# Patient Record
Sex: MALE | Race: WHITE | NOT HISPANIC OR LATINO
[De-identification: names, ages, dates, MRNs, and addresses within clinical notes are randomized per-mention and may not be internally consistent; named-entity substitution may affect disease eponyms.]

---

## 2019-10-21 PROBLEM — Z00.00 ENCOUNTER FOR PREVENTIVE HEALTH EXAMINATION: Status: ACTIVE | Noted: 2019-10-21

## 2019-10-25 ENCOUNTER — APPOINTMENT (OUTPATIENT)
Dept: CARDIOLOGY | Facility: CLINIC | Age: 39
End: 2019-10-25
Payer: COMMERCIAL

## 2019-10-25 VITALS
WEIGHT: 190 LBS | DIASTOLIC BLOOD PRESSURE: 70 MMHG | BODY MASS INDEX: 28.14 KG/M2 | HEIGHT: 69 IN | SYSTOLIC BLOOD PRESSURE: 110 MMHG

## 2019-10-25 DIAGNOSIS — Z84.1 FAMILY HISTORY OF DISORDERS OF KIDNEY AND URETER: ICD-10-CM

## 2019-10-25 DIAGNOSIS — R00.2 PALPITATIONS: ICD-10-CM

## 2019-10-25 DIAGNOSIS — Z83.49 FAMILY HISTORY OF OTHER ENDOCRINE, NUTRITIONAL AND METABOLIC DISEASES: ICD-10-CM

## 2019-10-25 DIAGNOSIS — Z87.891 PERSONAL HISTORY OF NICOTINE DEPENDENCE: ICD-10-CM

## 2019-10-25 DIAGNOSIS — Z83.3 FAMILY HISTORY OF DIABETES MELLITUS: ICD-10-CM

## 2019-10-25 DIAGNOSIS — Z82.49 FAMILY HISTORY OF ISCHEMIC HEART DISEASE AND OTHER DISEASES OF THE CIRCULATORY SYSTEM: ICD-10-CM

## 2019-10-25 PROCEDURE — 99204 OFFICE O/P NEW MOD 45 MIN: CPT

## 2019-10-25 PROCEDURE — 93000 ELECTROCARDIOGRAM COMPLETE: CPT

## 2019-10-25 RX ORDER — METFORMIN HYDROCHLORIDE 500 MG/1
500 TABLET, COATED ORAL
Refills: 0 | Status: ACTIVE | COMMUNITY

## 2019-10-25 RX ORDER — EMPAGLIFLOZIN 25 MG/1
25 TABLET, FILM COATED ORAL
Refills: 0 | Status: ACTIVE | COMMUNITY

## 2019-10-25 RX ORDER — LISINOPRIL 5 MG/1
5 TABLET ORAL
Refills: 0 | Status: ACTIVE | COMMUNITY

## 2019-10-25 NOTE — ASSESSMENT
[FreeTextEntry1] : Diabetes - c/w current therapy. Follow FS. repeat HgA1c with Dr. Marcial in 3 months.\par \par Dyslipidemia - elevated TG. Diet, weight loss disucssed. Repeat labs in 3 months - expect improvement, once DM is controlled.\par \par BP - controlled with lisinopril.\par \par Palpitations, abnormal ECG  - obtain echo to assess for structural heart disease, obtain stress test.\par \par If normal - primary prevention.\par \par Discussed with patient and wife.

## 2019-10-25 NOTE — PHYSICAL EXAM
[Well Groomed] : well groomed [General Appearance - Well Developed] : well developed [Normal Appearance] : normal appearance [No Deformities] : no deformities [General Appearance - In No Acute Distress] : no acute distress [General Appearance - Well Nourished] : well nourished [Normal Conjunctiva] : the conjunctiva exhibited no abnormalities [Eyelids - No Xanthelasma] : the eyelids demonstrated no xanthelasmas [Normal Oral Mucosa] : normal oral mucosa [FreeTextEntry1] : no JVD [No Oral Pallor] : no oral pallor [Heart Sounds] : normal S1 and S2 [Heart Rate And Rhythm] : heart rate and rhythm were normal [Murmurs] : no murmurs present [Edema] : no peripheral edema present [] : no respiratory distress [Auscultation Breath Sounds / Voice Sounds] : lungs were clear to auscultation bilaterally [Bowel Sounds] : normal bowel sounds [Respiration, Rhythm And Depth] : normal respiratory rhythm and effort [Abdomen Soft] : soft [Abdomen Tenderness] : non-tender [Abnormal Walk] : normal gait [Nail Clubbing] : no clubbing of the fingernails [Cyanosis, Localized] : no localized cyanosis [No Venous Stasis] : no venous stasis [Skin Color & Pigmentation] : normal skin color and pigmentation [Oriented To Time, Place, And Person] : oriented to person, place, and time [Affect] : the affect was normal

## 2019-11-15 ENCOUNTER — APPOINTMENT (OUTPATIENT)
Dept: CARDIOLOGY | Facility: CLINIC | Age: 39
End: 2019-11-15
Payer: COMMERCIAL

## 2019-11-15 PROCEDURE — 99213 OFFICE O/P EST LOW 20 MIN: CPT | Mod: 25

## 2019-11-15 PROCEDURE — 93306 TTE W/DOPPLER COMPLETE: CPT

## 2019-11-15 PROCEDURE — 93015 CV STRESS TEST SUPVJ I&R: CPT

## 2019-11-15 NOTE — PHYSICAL EXAM
[General Appearance - Well Developed] : well developed [Normal Appearance] : normal appearance [Well Groomed] : well groomed [No Deformities] : no deformities [General Appearance - Well Nourished] : well nourished [General Appearance - In No Acute Distress] : no acute distress [Normal Conjunctiva] : the conjunctiva exhibited no abnormalities [Eyelids - No Xanthelasma] : the eyelids demonstrated no xanthelasmas [Normal Oral Mucosa] : normal oral mucosa [No Oral Pallor] : no oral pallor [FreeTextEntry1] : no JVD [Heart Sounds] : normal S1 and S2 [Heart Rate And Rhythm] : heart rate and rhythm were normal [Murmurs] : no murmurs present [Edema] : no peripheral edema present [] : no respiratory distress [Respiration, Rhythm And Depth] : normal respiratory rhythm and effort [Bowel Sounds] : normal bowel sounds [Auscultation Breath Sounds / Voice Sounds] : lungs were clear to auscultation bilaterally [Abdomen Soft] : soft [Abdomen Tenderness] : non-tender [Nail Clubbing] : no clubbing of the fingernails [Abnormal Walk] : normal gait [Cyanosis, Localized] : no localized cyanosis [Skin Color & Pigmentation] : normal skin color and pigmentation [No Venous Stasis] : no venous stasis [Affect] : the affect was normal [Oriented To Time, Place, And Person] : oriented to person, place, and time

## 2019-11-15 NOTE — ASSESSMENT
[FreeTextEntry1] : Stress test and echo results discussed with patient.\par Normal LV function, no structural disease, normal exercise response.\par Fidnigds discussed with the patient.\par C/w DM, Lipid and BP control.\par Has been tobacco free since the last visit.\par \par C/w primary prevention.\par \par Discussed with patient \par F/u PRN

## 2022-09-23 ENCOUNTER — INPATIENT (INPATIENT)
Facility: HOSPITAL | Age: 42
LOS: 3 days | Discharge: HOME | End: 2022-09-27
Attending: INTERNAL MEDICINE | Admitting: INTERNAL MEDICINE

## 2022-09-23 VITALS
HEIGHT: 69 IN | OXYGEN SATURATION: 99 % | SYSTOLIC BLOOD PRESSURE: 126 MMHG | HEART RATE: 112 BPM | RESPIRATION RATE: 16 BRPM | WEIGHT: 149.91 LBS | DIASTOLIC BLOOD PRESSURE: 79 MMHG | TEMPERATURE: 98 F

## 2022-09-23 LAB
ALBUMIN SERPL ELPH-MCNC: 4.7 G/DL — SIGNIFICANT CHANGE UP (ref 3.5–5.2)
ALP SERPL-CCNC: 115 U/L — SIGNIFICANT CHANGE UP (ref 30–115)
ALT FLD-CCNC: 14 U/L — SIGNIFICANT CHANGE UP (ref 0–41)
ANION GAP SERPL CALC-SCNC: 14 MMOL/L — SIGNIFICANT CHANGE UP (ref 7–14)
ANION GAP SERPL CALC-SCNC: 18 MMOL/L — HIGH (ref 7–14)
ANION GAP SERPL CALC-SCNC: 37 MMOL/L — HIGH (ref 7–14)
APPEARANCE UR: CLEAR — SIGNIFICANT CHANGE UP
AST SERPL-CCNC: 12 U/L — SIGNIFICANT CHANGE UP (ref 0–41)
B-OH-BUTYR SERPL-SCNC: >9 MMOL/L — HIGH
BACTERIA # UR AUTO: NEGATIVE — SIGNIFICANT CHANGE UP
BASE EXCESS BLDV CALC-SCNC: -24.5 MMOL/L — LOW (ref -2–3)
BASOPHILS # BLD AUTO: 0.08 K/UL — SIGNIFICANT CHANGE UP (ref 0–0.2)
BASOPHILS NFR BLD AUTO: 0.4 % — SIGNIFICANT CHANGE UP (ref 0–1)
BILIRUB DIRECT SERPL-MCNC: <0.2 MG/DL — SIGNIFICANT CHANGE UP (ref 0–0.3)
BILIRUB INDIRECT FLD-MCNC: SIGNIFICANT CHANGE UP MG/DL (ref 0.2–1.2)
BILIRUB SERPL-MCNC: <0.2 MG/DL — SIGNIFICANT CHANGE UP (ref 0.2–1.2)
BILIRUB UR-MCNC: NEGATIVE — SIGNIFICANT CHANGE UP
BUN SERPL-MCNC: 15 MG/DL — SIGNIFICANT CHANGE UP (ref 10–20)
BUN SERPL-MCNC: 18 MG/DL — SIGNIFICANT CHANGE UP (ref 10–20)
BUN SERPL-MCNC: 23 MG/DL — HIGH (ref 10–20)
CALCIUM SERPL-MCNC: 10 MG/DL — SIGNIFICANT CHANGE UP (ref 8.4–10.5)
CALCIUM SERPL-MCNC: 12 MG/DL — HIGH (ref 8.4–10.5)
CALCIUM SERPL-MCNC: 9.8 MG/DL — SIGNIFICANT CHANGE UP (ref 8.4–10.5)
CHLORIDE SERPL-SCNC: 107 MMOL/L — SIGNIFICANT CHANGE UP (ref 98–110)
CHLORIDE SERPL-SCNC: 108 MMOL/L — SIGNIFICANT CHANGE UP (ref 98–110)
CHLORIDE SERPL-SCNC: 94 MMOL/L — LOW (ref 98–110)
CO2 SERPL-SCNC: 10 MMOL/L — LOW (ref 17–32)
CO2 SERPL-SCNC: 13 MMOL/L — LOW (ref 17–32)
CO2 SERPL-SCNC: 4 MMOL/L — CRITICAL LOW (ref 17–32)
COLOR SPEC: SIGNIFICANT CHANGE UP
CREAT SERPL-MCNC: 1 MG/DL — SIGNIFICANT CHANGE UP (ref 0.7–1.5)
CREAT SERPL-MCNC: 1 MG/DL — SIGNIFICANT CHANGE UP (ref 0.7–1.5)
CREAT SERPL-MCNC: 1.6 MG/DL — HIGH (ref 0.7–1.5)
CRP SERPL-MCNC: 153 MG/L — HIGH
D DIMER BLD IA.RAPID-MCNC: 608 NG/ML DDU — HIGH (ref 0–230)
DIFF PNL FLD: ABNORMAL
EGFR: 55 ML/MIN/1.73M2 — LOW
EGFR: 96 ML/MIN/1.73M2 — SIGNIFICANT CHANGE UP
EGFR: 96 ML/MIN/1.73M2 — SIGNIFICANT CHANGE UP
EOSINOPHIL # BLD AUTO: 0 K/UL — SIGNIFICANT CHANGE UP (ref 0–0.7)
EOSINOPHIL NFR BLD AUTO: 0 % — SIGNIFICANT CHANGE UP (ref 0–8)
EPI CELLS # UR: 2 /HPF — SIGNIFICANT CHANGE UP (ref 0–5)
ERYTHROCYTE [SEDIMENTATION RATE] IN BLOOD: 40 MM/HR — HIGH (ref 0–10)
GAS PNL BLDA: SIGNIFICANT CHANGE UP
GAS PNL BLDV: SIGNIFICANT CHANGE UP
GLUCOSE BLDC GLUCOMTR-MCNC: 121 MG/DL — HIGH (ref 70–99)
GLUCOSE BLDC GLUCOMTR-MCNC: 129 MG/DL — HIGH (ref 70–99)
GLUCOSE BLDC GLUCOMTR-MCNC: 132 MG/DL — HIGH (ref 70–99)
GLUCOSE BLDC GLUCOMTR-MCNC: 132 MG/DL — HIGH (ref 70–99)
GLUCOSE BLDC GLUCOMTR-MCNC: 136 MG/DL — HIGH (ref 70–99)
GLUCOSE BLDC GLUCOMTR-MCNC: 136 MG/DL — HIGH (ref 70–99)
GLUCOSE BLDC GLUCOMTR-MCNC: 140 MG/DL — HIGH (ref 70–99)
GLUCOSE BLDC GLUCOMTR-MCNC: 157 MG/DL — HIGH (ref 70–99)
GLUCOSE BLDC GLUCOMTR-MCNC: 161 MG/DL — HIGH (ref 70–99)
GLUCOSE SERPL-MCNC: 135 MG/DL — HIGH (ref 70–99)
GLUCOSE SERPL-MCNC: 140 MG/DL — HIGH (ref 70–99)
GLUCOSE SERPL-MCNC: 368 MG/DL — HIGH (ref 70–99)
GLUCOSE UR QL: ABNORMAL
HCO3 BLDV-SCNC: 6 MMOL/L — CRITICAL LOW (ref 22–29)
HCT VFR BLD CALC: 51.3 % — SIGNIFICANT CHANGE UP (ref 42–52)
HGB BLD-MCNC: 16.4 G/DL — SIGNIFICANT CHANGE UP (ref 14–18)
HYALINE CASTS # UR AUTO: 4 /LPF — SIGNIFICANT CHANGE UP (ref 0–7)
IMM GRANULOCYTES NFR BLD AUTO: 1.6 % — HIGH (ref 0.1–0.3)
KETONES UR-MCNC: ABNORMAL
LACTATE BLDV-MCNC: 2.4 MMOL/L — HIGH (ref 0.5–2)
LEUKOCYTE ESTERASE UR-ACNC: NEGATIVE — SIGNIFICANT CHANGE UP
LIDOCAIN IGE QN: 102 U/L — HIGH (ref 7–60)
LYMPHOCYTES # BLD AUTO: 1.07 K/UL — LOW (ref 1.2–3.4)
LYMPHOCYTES # BLD AUTO: 5.7 % — LOW (ref 20.5–51.1)
MAGNESIUM SERPL-MCNC: 2.4 MG/DL — SIGNIFICANT CHANGE UP (ref 1.8–2.4)
MCHC RBC-ENTMCNC: 28.7 PG — SIGNIFICANT CHANGE UP (ref 27–31)
MCHC RBC-ENTMCNC: 32 G/DL — SIGNIFICANT CHANGE UP (ref 32–37)
MCV RBC AUTO: 89.7 FL — SIGNIFICANT CHANGE UP (ref 80–94)
MONOCYTES # BLD AUTO: 0.91 K/UL — HIGH (ref 0.1–0.6)
MONOCYTES NFR BLD AUTO: 4.8 % — SIGNIFICANT CHANGE UP (ref 1.7–9.3)
NEUTROPHILS # BLD AUTO: 16.47 K/UL — HIGH (ref 1.4–6.5)
NEUTROPHILS NFR BLD AUTO: 87.5 % — HIGH (ref 42.2–75.2)
NITRITE UR-MCNC: NEGATIVE — SIGNIFICANT CHANGE UP
NRBC # BLD: 0 /100 WBCS — SIGNIFICANT CHANGE UP (ref 0–0)
OSMOLALITY SERPL: 325 MOS/KG — HIGH (ref 275–300)
PCO2 BLDV: 27 MMHG — LOW (ref 42–55)
PH BLDV: 6.97 — LOW (ref 7.32–7.43)
PH UR: 5.5 — SIGNIFICANT CHANGE UP (ref 5–8)
PHOSPHATE SERPL-MCNC: 6.6 MG/DL — HIGH (ref 2.1–4.9)
PLATELET # BLD AUTO: 363 K/UL — SIGNIFICANT CHANGE UP (ref 130–400)
PO2 BLDV: 34 MMHG — SIGNIFICANT CHANGE UP
POTASSIUM SERPL-MCNC: 4.7 MMOL/L — SIGNIFICANT CHANGE UP (ref 3.5–5)
POTASSIUM SERPL-MCNC: 6.1 MMOL/L — CRITICAL HIGH (ref 3.5–5)
POTASSIUM SERPL-MCNC: 6.5 MMOL/L — CRITICAL HIGH (ref 3.5–5)
POTASSIUM SERPL-SCNC: 4.7 MMOL/L — SIGNIFICANT CHANGE UP (ref 3.5–5)
POTASSIUM SERPL-SCNC: 6.1 MMOL/L — CRITICAL HIGH (ref 3.5–5)
POTASSIUM SERPL-SCNC: 6.5 MMOL/L — CRITICAL HIGH (ref 3.5–5)
PROT SERPL-MCNC: 7.9 G/DL — SIGNIFICANT CHANGE UP (ref 6–8)
PROT UR-MCNC: ABNORMAL
RBC # BLD: 5.72 M/UL — SIGNIFICANT CHANGE UP (ref 4.7–6.1)
RBC # FLD: 12.3 % — SIGNIFICANT CHANGE UP (ref 11.5–14.5)
RBC CASTS # UR COMP ASSIST: 6 /HPF — HIGH (ref 0–4)
SAO2 % BLDV: 52.2 % — SIGNIFICANT CHANGE UP
SARS-COV-2 RNA SPEC QL NAA+PROBE: DETECTED
SODIUM SERPL-SCNC: 134 MMOL/L — LOW (ref 135–146)
SODIUM SERPL-SCNC: 135 MMOL/L — SIGNIFICANT CHANGE UP (ref 135–146)
SODIUM SERPL-SCNC: 136 MMOL/L — SIGNIFICANT CHANGE UP (ref 135–146)
SP GR SPEC: 1.03 — SIGNIFICANT CHANGE UP (ref 1.01–1.03)
TROPONIN T SERPL-MCNC: <0.01 NG/ML — SIGNIFICANT CHANGE UP
UROBILINOGEN FLD QL: SIGNIFICANT CHANGE UP
WBC # BLD: 18.83 K/UL — HIGH (ref 4.8–10.8)
WBC # FLD AUTO: 18.83 K/UL — HIGH (ref 4.8–10.8)
WBC UR QL: 1 /HPF — SIGNIFICANT CHANGE UP (ref 0–5)

## 2022-09-23 PROCEDURE — 99221 1ST HOSP IP/OBS SF/LOW 40: CPT

## 2022-09-23 PROCEDURE — 93010 ELECTROCARDIOGRAM REPORT: CPT

## 2022-09-23 PROCEDURE — 71045 X-RAY EXAM CHEST 1 VIEW: CPT | Mod: 26

## 2022-09-23 PROCEDURE — G1004: CPT

## 2022-09-23 PROCEDURE — 99291 CRITICAL CARE FIRST HOUR: CPT | Mod: GC

## 2022-09-23 PROCEDURE — 74177 CT ABD & PELVIS W/CONTRAST: CPT | Mod: 26,MF

## 2022-09-23 PROCEDURE — 99291 CRITICAL CARE FIRST HOUR: CPT

## 2022-09-23 RX ORDER — INSULIN HUMAN 100 [IU]/ML
4 INJECTION, SOLUTION SUBCUTANEOUS
Qty: 100 | Refills: 0 | Status: DISCONTINUED | OUTPATIENT
Start: 2022-09-23 | End: 2022-09-24

## 2022-09-23 RX ORDER — SODIUM CHLORIDE 9 MG/ML
1000 INJECTION, SOLUTION INTRAVENOUS
Refills: 0 | Status: DISCONTINUED | OUTPATIENT
Start: 2022-09-23 | End: 2022-09-23

## 2022-09-23 RX ORDER — SITAGLIPTIN AND METFORMIN HYDROCHLORIDE 500; 50 MG/1; MG/1
0 TABLET, FILM COATED ORAL
Qty: 0 | Refills: 0 | DISCHARGE

## 2022-09-23 RX ORDER — SODIUM CHLORIDE 9 MG/ML
2000 INJECTION, SOLUTION INTRAVENOUS ONCE
Refills: 0 | Status: COMPLETED | OUTPATIENT
Start: 2022-09-23 | End: 2022-09-23

## 2022-09-23 RX ORDER — SODIUM CHLORIDE 9 MG/ML
1000 INJECTION INTRAMUSCULAR; INTRAVENOUS; SUBCUTANEOUS ONCE
Refills: 0 | Status: COMPLETED | OUTPATIENT
Start: 2022-09-23 | End: 2022-09-23

## 2022-09-23 RX ORDER — FAMOTIDINE 10 MG/ML
20 INJECTION INTRAVENOUS ONCE
Refills: 0 | Status: COMPLETED | OUTPATIENT
Start: 2022-09-23 | End: 2022-09-23

## 2022-09-23 RX ORDER — ONDANSETRON 8 MG/1
4 TABLET, FILM COATED ORAL ONCE
Refills: 0 | Status: COMPLETED | OUTPATIENT
Start: 2022-09-23 | End: 2022-09-23

## 2022-09-23 RX ORDER — CHLORHEXIDINE GLUCONATE 213 G/1000ML
1 SOLUTION TOPICAL
Refills: 0 | Status: DISCONTINUED | OUTPATIENT
Start: 2022-09-23 | End: 2022-09-27

## 2022-09-23 RX ORDER — INSULIN HUMAN 100 [IU]/ML
6.8 INJECTION, SOLUTION SUBCUTANEOUS
Qty: 100 | Refills: 0 | Status: DISCONTINUED | OUTPATIENT
Start: 2022-09-23 | End: 2022-09-23

## 2022-09-23 RX ORDER — INSULIN HUMAN 100 [IU]/ML
10 INJECTION, SOLUTION SUBCUTANEOUS ONCE
Refills: 0 | Status: COMPLETED | OUTPATIENT
Start: 2022-09-23 | End: 2022-09-23

## 2022-09-23 RX ORDER — SODIUM CHLORIDE 9 MG/ML
1000 INJECTION, SOLUTION INTRAVENOUS
Refills: 0 | Status: DISCONTINUED | OUTPATIENT
Start: 2022-09-23 | End: 2022-09-24

## 2022-09-23 RX ORDER — ACETAMINOPHEN 500 MG
650 TABLET ORAL EVERY 6 HOURS
Refills: 0 | Status: DISCONTINUED | OUTPATIENT
Start: 2022-09-23 | End: 2022-09-27

## 2022-09-23 RX ORDER — HEPARIN SODIUM 5000 [USP'U]/ML
5000 INJECTION INTRAVENOUS; SUBCUTANEOUS EVERY 12 HOURS
Refills: 0 | Status: DISCONTINUED | OUTPATIENT
Start: 2022-09-23 | End: 2022-09-27

## 2022-09-23 RX ORDER — ONDANSETRON 8 MG/1
4 TABLET, FILM COATED ORAL EVERY 8 HOURS
Refills: 0 | Status: DISCONTINUED | OUTPATIENT
Start: 2022-09-23 | End: 2022-09-27

## 2022-09-23 RX ORDER — ATORVASTATIN CALCIUM 80 MG/1
10 TABLET, FILM COATED ORAL DAILY
Refills: 0 | Status: DISCONTINUED | OUTPATIENT
Start: 2022-09-23 | End: 2022-09-27

## 2022-09-23 RX ORDER — INSULIN HUMAN 100 [IU]/ML
6 INJECTION, SOLUTION SUBCUTANEOUS ONCE
Refills: 0 | Status: DISCONTINUED | OUTPATIENT
Start: 2022-09-23 | End: 2022-09-23

## 2022-09-23 RX ORDER — PANTOPRAZOLE SODIUM 20 MG/1
40 TABLET, DELAYED RELEASE ORAL DAILY
Refills: 0 | Status: DISCONTINUED | OUTPATIENT
Start: 2022-09-23 | End: 2022-09-25

## 2022-09-23 RX ORDER — LANOLIN ALCOHOL/MO/W.PET/CERES
3 CREAM (GRAM) TOPICAL AT BEDTIME
Refills: 0 | Status: DISCONTINUED | OUTPATIENT
Start: 2022-09-23 | End: 2022-09-27

## 2022-09-23 RX ADMIN — ONDANSETRON 4 MILLIGRAM(S): 8 TABLET, FILM COATED ORAL at 09:44

## 2022-09-23 RX ADMIN — INSULIN HUMAN 4 UNIT(S)/HR: 100 INJECTION, SOLUTION SUBCUTANEOUS at 13:30

## 2022-09-23 RX ADMIN — SODIUM CHLORIDE 1000 MILLILITER(S): 9 INJECTION INTRAMUSCULAR; INTRAVENOUS; SUBCUTANEOUS at 10:25

## 2022-09-23 RX ADMIN — SODIUM CHLORIDE 200 MILLILITER(S): 9 INJECTION, SOLUTION INTRAVENOUS at 16:45

## 2022-09-23 RX ADMIN — SODIUM CHLORIDE 1000 MILLILITER(S): 9 INJECTION INTRAMUSCULAR; INTRAVENOUS; SUBCUTANEOUS at 10:15

## 2022-09-23 RX ADMIN — INSULIN HUMAN 6.8 UNIT(S)/HR: 100 INJECTION, SOLUTION SUBCUTANEOUS at 11:43

## 2022-09-23 RX ADMIN — SODIUM CHLORIDE 200 MILLILITER(S): 9 INJECTION, SOLUTION INTRAVENOUS at 14:42

## 2022-09-23 RX ADMIN — SODIUM CHLORIDE 2000 MILLILITER(S): 9 INJECTION, SOLUTION INTRAVENOUS at 11:04

## 2022-09-23 RX ADMIN — SODIUM CHLORIDE 2000 MILLILITER(S): 9 INJECTION, SOLUTION INTRAVENOUS at 13:44

## 2022-09-23 RX ADMIN — INSULIN HUMAN 10 UNIT(S): 100 INJECTION, SOLUTION SUBCUTANEOUS at 11:42

## 2022-09-23 RX ADMIN — FAMOTIDINE 20 MILLIGRAM(S): 10 INJECTION INTRAVENOUS at 09:47

## 2022-09-23 RX ADMIN — SODIUM CHLORIDE 1000 MILLILITER(S): 9 INJECTION INTRAMUSCULAR; INTRAVENOUS; SUBCUTANEOUS at 11:07

## 2022-09-23 RX ADMIN — SODIUM CHLORIDE 1000 MILLILITER(S): 9 INJECTION INTRAMUSCULAR; INTRAVENOUS; SUBCUTANEOUS at 09:47

## 2022-09-23 NOTE — CONSULT NOTE ADULT - SUBJECTIVE AND OBJECTIVE BOX
HPI:  41 yo M with PMHX of latent autoimmune diabetes ( DM 1.5) presents to the ED for flu like symptoms after testing for COVID 5 days ago. Patient states that his symptoms started on monday where he was having fevers, shortness of breath and decreased po intake. Because he was not eating patient did not take his diabetes medications. He endorses one episode of vomiting which he had after taking paxlovid yesterday for the first time yesterday. Patient has not had any BM for 4 days now. he does endorse passing gas.     In the ED     T(F): 97.6 (23 Sep 2022 12:49), Max: 97.8 (23 Sep 2022 08:23)  HR: 109 (23 Sep 2022 12:49) (109 - 112)  BP: 120/74 (23 Sep 2022 12:49) (120/74 - 126/79)  BP(mean): 93 (23 Sep 2022 12:49) (93 - 94)  RR: 18 (23 Sep 2022 12:49) (16 - 18)  SpO2: 100%       LABS:                        16.4   18.83 )-----------( 363      ( 23 Sep 2022 09:40 )             51.3       135  |  94<L>  |  23<H>  ----------------------------<  368<H>  6.5<HH>   |  4<LL>  |  1.6<H>    Ca    12.0<H>      23 Sep 2022 09:40  Phos  6.6       Mg     2.4         TPro  7.9  /  Alb  4.7  /  TBili  <0.2  /  DBili  <0.2  /  AST  12  /  ALT  14  /  AlkPhos  115  09-23    Beta Hydroxy-Butyrate: >9.0 mmoL/L (22 @ 09:40)     Color: Light Yellow / Appearance: Clear / S.029 / pH: x  Gluc: x / Ketone: Large  / Bili: Negative / Urobili: <2 mg/dL   Blood: x / Protein: 30 mg/dL / Nitrite: Negative   Leuk Esterase: Negative / RBC: 6 /HPF / WBC 1 /HPF   Sq Epi: x / Non Sq Epi: 2 /HPF / Bacteria: Negative    CT abdomen - Severely distended fluid-filled stomach with transition at the duodenum.  This could represent gastric outlet obstruction versus gastroparesis. Bilateral lower lung zone opacities as detailed above. Correlate for infectious/inflammatory etiologies. (23 Sep 2022 14:23)      PAST MEDICAL & SURGICAL HISTORY  Diabetes      SOCIAL HISTORY:  []smoker  []Alcohol  []Drug    ALLERGIES:  No Known Allergies      MEDICATIONS:  MEDICATIONS  (STANDING):  atorvastatin Oral Tab/Cap - Peds 10 milliGRAM(s) Oral daily  chlorhexidine 2% Cloths 1 Application(s) Topical <User Schedule>  dextrose 5% + sodium chloride 0.45% 1000 milliLiter(s) (200 mL/Hr) IV Continuous <Continuous>  heparin   Injectable 5000 Unit(s) SubCutaneous every 12 hours  insulin regular Infusion 4 Unit(s)/Hr (4 mL/Hr) IV Continuous <Continuous>  pantoprazole  Injectable 40 milliGRAM(s) IV Push daily    MEDICATIONS  (PRN):  acetaminophen     Tablet .. 650 milliGRAM(s) Oral every 6 hours PRN Temp greater or equal to 38C (100.4F), Mild Pain (1 - 3)  aluminum hydroxide/magnesium hydroxide/simethicone Suspension 30 milliLiter(s) Oral every 4 hours PRN Dyspepsia  melatonin 3 milliGRAM(s) Oral at bedtime PRN Insomnia  ondansetron Injectable 4 milliGRAM(s) IV Push every 8 hours PRN Nausea and/or Vomiting      HOME MEDICATIONS:  Home Medications:  ATORVASTATIN 10 MG TABLET:  (23 Sep 2022 14:34)  Janumet  mg-1000 mg oral tablet, extended release: 1 tab(s) orally once a day (in the evening) (23 Sep 2022 14:34)  JARDIANCE 10 MG TABLET: TAKE 1 TABLET BY MOUTH EVERY DAY (23 Sep 2022 14:34)  LISINOPRIL 5 MG TABLET:  (23 Sep 2022 14:34)  TRESIBA FLEXTOUCH 100 UNIT/ML: INJECT 8 UNITS UNDER THE SKIN NIGHTLY. INDICATION DIABETES (23 Sep 2022 14:34)      VITALS:   T(F): 97.7 ( @ 15:49), Max: 98.3 ( @ 15:33)  HR: 102 ( @ 15:49) (98 - 112)  BP: 98/63 ( @ 15:49) (98/63 - 126/79)  BP(mean): 81 ( @ 15:33) (81 - 94)  RR: 18 ( @ 15:49) (16 - 18)  SpO2: 98% ( @ 15:49) (98% - 100%)    I&O's Summary    23 Sep 2022 07:01  -  23 Sep 2022 16:18  --------------------------------------------------------  IN: 1000 mL / OUT: 0 mL / NET: 1000 mL        REVIEW OF SYSTEMS:  CONSTITUTIONAL: No weakness, fevers or chills  EYES: No visual changes  ENT: No vertigo or throat pain   NECK: No pain or stiffness  RESPIRATORY: + cough, wheezing; + shortness of breath  CARDIOVASCULAR: No chest pain or palpitations  GASTROINTESTINAL: No abdominal or epigastric pain. + nausea, vomiting; No diarrhea, + constipation.  GENITOURINARY: + Polyuria, polydipsia   NEUROLOGICAL:  No tremors, no Weakness or numbness  SKIN: No itching, no rashes  MSK: no joint pain    PHYSICAL EXAM:  GENERAL: Patient is awake , alert and oriented,  not in acute distress  EYES: No proptosis, no lid lag  NECK: No thyroid enlargement, no palpable nodules , no bruit  LUNGS: Clear to auscultation bilaterally   CARDIOVASCULAR: S1/S2 present, RRR , no murmurs or rubs  ABD: Soft, non-tender, non-distended, +BS  EXT: No KAITLIN  SKIN: No lesions   NEURO: No tremors    LABS:                        16.4   18.83 )-----------( 363      ( 23 Sep 2022 09:40 )             51.3         135  |  94<L>  |  23<H>  ----------------------------<  368<H>  6.5<HH>   |  4<LL>  |  1.6<H>    Ca    12.0<H>      23 Sep 2022 09:40  Phos  6.6       Mg     2.4         TPro  7.9  /  Alb  4.7  /  TBili  <0.2  /  DBili  <0.2  /  AST  12  /  ALT  14  /  AlkPhos  115        POCT Blood Glucose.: 161 mg/dL (22 @ 16:06)  POCT Blood Glucose.: 132 mg/dL (22 @ 15:06)  POCT Blood Glucose.: 157 mg/dL (22 @ 14:13)  POCT Blood Glucose.: 226 mg/dL (22 @ 12:56)  POCT Blood Glucose.: 250 mg/dL (22 @ 12:02)  POCT Blood Glucose.: 318 mg/dL (22 @ 08:28)    < from: CT Abdomen and Pelvis w/ IV Cont (22 @ 10:50) >    IMPRESSION:    No CT evidence of pyelonephritis.    Severely distended fluid-filled stomach with transition at the duodenum.    This could represent gastric outlet obstruction versus gastroparesis.    Bilateral lower lung zone opacities as detailed above. Correlate for   infectious/inflammatory etiologies.    --- End of Report ---  < end of copied text >     HPI:  41 yo M with PMHX of latent autoimmune diabetes ( DM 1.5) presents to the ED for flu like symptoms after testing for COVID 5 days ago. Patient states that his symptoms started on monday where he was having fevers, shortness of breath and decreased po intake. Because he was not eating patient did not take his diabetes medications. He endorses one episode of vomiting which he had after taking paxlovid yesterday for the first time yesterday. Patient has not had any BM for 4 days now. he does endorse passing gas.     In the ED     T(F): 97.6 (23 Sep 2022 12:49), Max: 97.8 (23 Sep 2022 08:23)  HR: 109 (23 Sep 2022 12:49) (109 - 112)  BP: 120/74 (23 Sep 2022 12:49) (120/74 - 126/79)  BP(mean): 93 (23 Sep 2022 12:49) (93 - 94)  RR: 18 (23 Sep 2022 12:49) (16 - 18)  SpO2: 100%       LABS:                        16.4   18.83 )-----------( 363      ( 23 Sep 2022 09:40 )             51.3       135  |  94<L>  |  23<H>  ----------------------------<  368<H>  6.5<HH>   |  4<LL>  |  1.6<H>    Ca    12.0<H>      23 Sep 2022 09:40  Phos  6.6       Mg     2.4         TPro  7.9  /  Alb  4.7  /  TBili  <0.2  /  DBili  <0.2  /  AST  12  /  ALT  14  /  AlkPhos  115  09-23    Beta Hydroxy-Butyrate: >9.0 mmoL/L (22 @ 09:40)     Color: Light Yellow / Appearance: Clear / S.029 / pH: x  Gluc: x / Ketone: Large  / Bili: Negative / Urobili: <2 mg/dL   Blood: x / Protein: 30 mg/dL / Nitrite: Negative   Leuk Esterase: Negative / RBC: 6 /HPF / WBC 1 /HPF   Sq Epi: x / Non Sq Epi: 2 /HPF / Bacteria: Negative    CT abdomen - Severely distended fluid-filled stomach with transition at the duodenum.  This could represent gastric outlet obstruction versus gastroparesis. Bilateral lower lung zone opacities as detailed above. Correlate for infectious/inflammatory etiologies. (23 Sep 2022 14:23)      PAST MEDICAL & SURGICAL HISTORY  Diabetes      SOCIAL HISTORY:  []smoker  []Alcohol  []Drug    ALLERGIES:  No Known Allergies      MEDICATIONS:  MEDICATIONS  (STANDING):  atorvastatin Oral Tab/Cap - Peds 10 milliGRAM(s) Oral daily  chlorhexidine 2% Cloths 1 Application(s) Topical <User Schedule>  dextrose 5% + sodium chloride 0.45% 1000 milliLiter(s) (200 mL/Hr) IV Continuous <Continuous>  heparin   Injectable 5000 Unit(s) SubCutaneous every 12 hours  insulin regular Infusion 4 Unit(s)/Hr (4 mL/Hr) IV Continuous <Continuous>  pantoprazole  Injectable 40 milliGRAM(s) IV Push daily    MEDICATIONS  (PRN):  acetaminophen     Tablet .. 650 milliGRAM(s) Oral every 6 hours PRN Temp greater or equal to 38C (100.4F), Mild Pain (1 - 3)  aluminum hydroxide/magnesium hydroxide/simethicone Suspension 30 milliLiter(s) Oral every 4 hours PRN Dyspepsia  melatonin 3 milliGRAM(s) Oral at bedtime PRN Insomnia  ondansetron Injectable 4 milliGRAM(s) IV Push every 8 hours PRN Nausea and/or Vomiting      HOME MEDICATIONS:  Home Medications:  ATORVASTATIN 10 MG TABLET:  (23 Sep 2022 14:34)  Janumet  mg-1000 mg oral tablet, extended release: 1 tab(s) orally once a day (in the evening) (23 Sep 2022 14:34)  JARDIANCE 10 MG TABLET: TAKE 1 TABLET BY MOUTH EVERY DAY (23 Sep 2022 14:34)  LISINOPRIL 5 MG TABLET:  (23 Sep 2022 14:34)  TRESIBA FLEXTOUCH 100 UNIT/ML: INJECT 8 UNITS UNDER THE SKIN NIGHTLY. INDICATION DIABETES (23 Sep 2022 14:34)      VITALS:   T(F): 97.7 ( @ 15:49), Max: 98.3 ( @ 15:33)  HR: 102 ( @ 15:49) (98 - 112)  BP: 98/63 ( @ 15:49) (98/63 - 126/79)  BP(mean): 81 ( @ 15:33) (81 - 94)  RR: 18 ( @ 15:49) (16 - 18)  SpO2: 98% ( @ 15:49) (98% - 100%)    I&O's Summary    23 Sep 2022 07:01  -  23 Sep 2022 16:18  --------------------------------------------------------  IN: 1000 mL / OUT: 0 mL / NET: 1000 mL        REVIEW OF SYSTEMS:  CONSTITUTIONAL: No weakness, fevers or chills  EYES: No visual changes  ENT: No vertigo or throat pain   NECK: No pain or stiffness  RESPIRATORY: + cough, wheezing; + shortness of breath  CARDIOVASCULAR: No chest pain or palpitations  GASTROINTESTINAL: No abdominal or epigastric pain. + nausea, vomiting; No diarrhea, + constipation.  GENITOURINARY: + Polyuria, polydipsia   NEUROLOGICAL:  No tremors, no Weakness or numbness  SKIN: No itching, no rashes  MSK: no joint pain    PHYSICAL EXAM:  GENERAL: Patient is awake , alert and oriented,  not in acute distress  EYES: No proptosis, no lid lag  NECK: No thyroid enlargement  LUNGS: Clear to auscultation bilaterally   CARDIOVASCULAR: S1/S2 present, RRR , no murmurs or rubs  ABD: Soft, non-tender, non-distended, +BS  EXT: No KAITLIN  SKIN: No lesions   NEURO: No tremors    LABS:                        16.4   18.83 )-----------( 363      ( 23 Sep 2022 09:40 )             51.3         135  |  94<L>  |  23<H>  ----------------------------<  368<H>  6.5<HH>   |  4<LL>  |  1.6<H>    Ca    12.0<H>      23 Sep 2022 09:40  Phos  6.6       Mg     2.4         TPro  7.9  /  Alb  4.7  /  TBili  <0.2  /  DBili  <0.2  /  AST  12  /  ALT  14  /  AlkPhos  115        POCT Blood Glucose.: 161 mg/dL (22 @ 16:06)  POCT Blood Glucose.: 132 mg/dL (22 @ 15:06)  POCT Blood Glucose.: 157 mg/dL (22 @ 14:13)  POCT Blood Glucose.: 226 mg/dL (22 @ 12:56)  POCT Blood Glucose.: 250 mg/dL (22 @ 12:02)  POCT Blood Glucose.: 318 mg/dL (22 @ 08:28)    < from: CT Abdomen and Pelvis w/ IV Cont (22 @ 10:50) >    IMPRESSION:    No CT evidence of pyelonephritis.    Severely distended fluid-filled stomach with transition at the duodenum.    This could represent gastric outlet obstruction versus gastroparesis.    Bilateral lower lung zone opacities as detailed above. Correlate for   infectious/inflammatory etiologies.    --- End of Report ---  < end of copied text >

## 2022-09-23 NOTE — H&P ADULT - NSHPPHYSICALEXAM_GEN_ALL_CORE
GENERAL: NAD, lying in bed comfortably  CHEST/LUNG: Clear to auscultation bilaterally; No rales, rhonchi, wheezing, or rubs. Unlabored respirations  HEART: Regular rate and rhythm; No murmurs, rubs, or gallops  ABDOMEN: Bowel sounds present; Soft, Nontender, Nondistended. No hepatomegally  EXTREMITIES:  2+ Peripheral Pulses, brisk capillary refill. No clubbing, cyanosis, or edema  NERVOUS SYSTEM:  Alert & Oriented X3, speech clear. No deficits   MSK: FROM all 4 extremities, full and equal strength  SKIN: No rashes or lesions

## 2022-09-23 NOTE — ED ADULT NURSE REASSESSMENT NOTE - NS ED NURSE REASSESS COMMENT FT1
pt moved to crit area as per dr maciel, , pt with abnormal labs. pt lethargic. report given to crit lead.
patient reassessed, observed resting in bed  no indication of pain or discomfort  vs reassessed, wnl. insulin infusing as ordered q1 fs documented
patient reassessed, observed resting in bed  c/o throat discomfort. VS reassessed, wnl.  awaiting on SX consult.  insulin and bicarb infusing as ordered  isolation precautions maintained

## 2022-09-23 NOTE — CONSULT NOTE ADULT - SUBJECTIVE AND OBJECTIVE BOX
GENERAL SURGERY CONSULT NOTE    Patient: IRMA ENNIS , 42y (80)Male   MRN: [id number]  Location: [facility] 024 A  Visit: 22 Inpatient  Date: 22 @ 16:23    HPI:  41 yo M with PMHX of latent autoimmune diabetes (DM 1.5) presents to the ED for flu like symptoms after testing for COVID 5 days ago. Patient states that his symptoms started on monday where he was having fevers, shortness of breath and decreased po intake. Because he was not eating patient did not take his diabetes medications. He endorses one episode of vomiting which he had after taking paxlovid yesterday for the first time yesterday. Patient has not had any BM for 4 days now. he does endorse passing gas.    (23 Sep 2022 14:23)    Surgery consulted to r/o gastric outlet obstruction.       PAST MEDICAL & SURGICAL HISTORY:  Diabetes    Home Medications:  ATORVASTATIN 10 MG TABLET:  (23 Sep 2022 14:34)  Janumet  mg-1000 mg oral tablet, extended release: 1 tab(s) orally once a day (in the evening) (23 Sep 2022 14:34)  JARDIANCE 10 MG TABLET: TAKE 1 TABLET BY MOUTH EVERY DAY (23 Sep 2022 14:34)  LISINOPRIL 5 MG TABLET:  (23 Sep 2022 14:34)  TRESIBA FLEXTOUCH 100 UNIT/ML: INJECT 8 UNITS UNDER THE SKIN NIGHTLY. INDICATION DIABETES (23 Sep 2022 14:34)    VITALS:  T(F): 97.7 (22 @ 15:49), Max: 98.3 (22 @ 15:33)  HR: 102 (22 @ 15:49) (98 - 112)  BP: 98/63 (22 @ 15:49) (98/63 - 126/79)  RR: 18 (22 @ 15:49) (16 - 18)  SpO2: 98% (22 @ 15:49) (98% - 100%)    PHYSICAL EXAM:  General: NAD, AAOx3, calm and cooperative  HEENT: NCAT, EOMI  Cardiac: S1, S2  Respiratory: normal respiratory effort, bilateral breath sounds   Abdomen: Soft, non-distended, non-tender, no rebound, no guarding  Skin: Warm/dry, normal color, no jaundice    MEDICATIONS  (STANDING):  atorvastatin Oral Tab/Cap - Peds 10 milliGRAM(s) Oral daily  chlorhexidine 2% Cloths 1 Application(s) Topical <User Schedule>  dextrose 5% + sodium chloride 0.45% 1000 milliLiter(s) (200 mL/Hr) IV Continuous <Continuous>  heparin   Injectable 5000 Unit(s) SubCutaneous every 12 hours  insulin regular Infusion 4 Unit(s)/Hr (4 mL/Hr) IV Continuous <Continuous>  pantoprazole  Injectable 40 milliGRAM(s) IV Push daily    MEDICATIONS  (PRN):  acetaminophen     Tablet .. 650 milliGRAM(s) Oral every 6 hours PRN Temp greater or equal to 38C (100.4F), Mild Pain (1 - 3)  aluminum hydroxide/magnesium hydroxide/simethicone Suspension 30 milliLiter(s) Oral every 4 hours PRN Dyspepsia  melatonin 3 milliGRAM(s) Oral at bedtime PRN Insomnia  ondansetron Injectable 4 milliGRAM(s) IV Push every 8 hours PRN Nausea and/or Vomiting    LAB/STUDIES:                        16.4   18.83 )-----------( 363      ( 23 Sep 2022 09:40 )             51.3         135  |  94<L>  |  23<H>  ----------------------------<  368<H>  6.5<HH>   |  4<LL>  |  1.6<H>    Ca    12.0<H>      23 Sep 2022 09:40  Phos  6.6       Mg     2.4         TPro  7.9  /  Alb  4.7  /  TBili  <0.2  /  DBili  <0.2  /  AST  12  /  ALT  14  /  AlkPhos  115      LIVER FUNCTIONS - ( 23 Sep 2022 09:40 )  Alb: 4.7 g/dL / Pro: 7.9 g/dL / ALK PHOS: 115 U/L / ALT: 14 U/L / AST: 12 U/L / GGT: x           Urinalysis Basic - ( 23 Sep 2022 11:27 )    Color: Light Yellow / Appearance: Clear / S.029 / pH: x  Gluc: x / Ketone: Large  / Bili: Negative / Urobili: <2 mg/dL   Blood: x / Protein: 30 mg/dL / Nitrite: Negative   Leuk Esterase: Negative / RBC: 6 /HPF / WBC 1 /HPF   Sq Epi: x / Non Sq Epi: 2 /HPF / Bacteria: Negative    ABG - ( 23 Sep 2022 13:58 )  pH, Arterial: 7.14  pH, Blood: x     /  pCO2: 14    /  pO2: 113   / HCO3: 5     / Base Excess: -21.8 /  SaO2: 97.4      IMAGING:  < from: Xray Chest 1 View- PORTABLE-Urgent (22 @ 09:44) >  Impression:  Left basilar opacity, may be of infectious etiology in the appropriate   clinical setting. Recommend follow-up to resolution.  --- End of Report ---    < from: CT Abdomen and Pelvis w/ IV Cont (22 @ 10:50) >  IMPRESSION:  No CT evidence of pyelonephritis.    Severely distended fluid-filled stomach with transition at the duodenum.    This could represent gastric outlet obstruction versus gastroparesis.    Bilateral lower lung zone opacities as detailed above. Correlate for   infectious/inflammatory etiologies.  --- End of Report ---

## 2022-09-23 NOTE — ED PROVIDER NOTE - NS ED ATTENDING STATEMENT MOD
This was a shared visit with the RONI. I reviewed and verified the documentation and independently performed the documented: I have personally provided the amount of critical care time documented below concurrently with the resident/fellow.  This time excludes time spent on separate procedures and time spent teaching. I have reviewed the resident’s / fellow’s documentation and I agree with the history, exam, and assessment and plan of care.

## 2022-09-23 NOTE — ED PROVIDER NOTE - NS ED ROS FT
Constitutional: (+) fever  Eyes/ENT: (-) blurry vision, (-) epistaxis  Cardiovascular: (-) chest pain, (-) syncope  Respiratory: (+) cough, (-) shortness of breath  Gastrointestinal: (+) nausea, (+) decreased appetite (-) vomiting, (-) diarrhea  Musculoskeletal: (-) neck pain, (+) back pain, (-) joint pain  Integumentary: (-) rash, (-) edema  Neurological: (+) headache, (-) altered mental status  Psychiatric: (-) hallucinations  Allergic/Immunologic: (-) pruritus

## 2022-09-23 NOTE — ED PROVIDER NOTE - PROGRESS NOTE DETAILS
VBG bicarb 6, patient upgraded to critical care and signed out to Dr Gisell Reed. Derek: delayed note due to patient care.  Received signout from KEEGAN Tomas. Pt awake, alert, oriented. Abd s/nt/nd.  Insulin bolus and drip ordered and given/started.  K 6.5, no EKG changes noted.  CT A/P sig for poss gastric outlet obstruction vs gastroparesis - surgery consulted, will follow  Discussed with ICU fellow Dr. Cali - approved for ICU. Endorsed to ICU resident Dr. Wells.

## 2022-09-23 NOTE — ED PROVIDER NOTE - OBJECTIVE STATEMENT
42-year-old male with history of diabetes presents to the ED complaining of testing COVID-positive 5 days ago.  Patient complaining of cough, shortness of breath, back pain, decreased appetite and nausea.  Patient states only had fever for 1 day.  No chest pain, abdominal pain, vomiting or diarrhea. 42-year-old male with history of diabetes presents to the ED complaining of testing COVID-positive 5 days ago.  Patient complaining of cough, shortness of breath, back pain, decreased appetite and nausea.  Patient states only had fever for 1 day. Patient states feels dizzy and weak. No chest pain, abdominal pain, vomiting or diarrhea.

## 2022-09-23 NOTE — CONSULT NOTE ADULT - ATTENDING COMMENTS
patient seen and evaluatated. has DKA and a idalted stomach on CT. abdomen soft, not tender, not distended, no vomitying. ? gastric outlet obstruction vs gastroparesis. may need ng if vomits. GI consult. will follow.
Sever DKA, HECTOR   - check Hba1c   - patient was diagnosed with HECTOR and recently was started on tresiba ( around 2-3 months ago ) takes 8 units at bedtime , but also on jardiance 10 mg daily and janumet 100/1000 mg BID . follows with endocrinology in [place]   - he stopped taking all his DM meds as he was not eating well since getting COVID and was concerned about low BG , which likely precipitated the DKA plus possible Gi obstruction   - agree with insulin drip check BMP every 4 hrs ( sent STAT ) , monitor electrolytes closely   - do not take of insulin drip until AG close on 2 BMP and HCO3 > 18 , agree with change in fluid to d5 na as FS now ok   - surgery on board for possible obstruction   discussed with team

## 2022-09-23 NOTE — ED PROVIDER NOTE - CLINICAL SUMMARY MEDICAL DECISION MAKING FREE TEXT BOX
42-year-old male presents emergency department for abdominal pain and intractable vomiting, known to be diabetic.  States disease compliance as normal but in the past 5 days has been unable to get sugars under control.  In the emergency department appears ill but not toxic, had screening labs, imaging, reevaluation.  Identified as profoundly acidemic with low bicarb on venous gas and concern for high anion gap metabolic acidosis raised.  Patient moved from main area to critical care area for enhanced nursing in the emergency department.  Results of screening imaging reveal possible ileus versus gastric outlet obstruction, patient not vomiting bedside, risk of NG tube greater than benefits at this time, more likely gastroparesis given current diabetic state.  Plan is for ICU admission, had parenteral insulin infusion initiated, fluid repletion, electrolyte repletion.  Case discussed with patient's family at bedside with questions answered.

## 2022-09-23 NOTE — ED ADULT TRIAGE NOTE - CHIEF COMPLAINT QUOTE
"I have COVID for 5 days and I'm a diabetic. I haven't been eating, I have backache, cough, I'm tired all the time."

## 2022-09-23 NOTE — ED PROVIDER NOTE - PHYSICAL EXAMINATION
Vital Signs: I have reviewed the initial vital signs.  Constitutional: NAD, flushed appearance, appears stated age.  HEENT: Airway patent, +dry MM, no erythema/swelling/deformity of oral structures. EOMI, PERRLA.  CV:  tachycardic regular rate, regular rhythm, well-perfused extremities, 2+ b/l DP and radial pulses equal.  Lungs: BCTA, no increased WOB.  ABD: NT, ND, no guarding or rebound, no pulsatile mass, no hernias.   MSK: Neck supple, nontender, nl ROM, no stepoff. Chest nontender. Back nontender in TLS spine or to b/l bony structures or flanks. Ext nontender, nl rom, no deformity.   INTEG: Skin warm, dry, no rash.  NEURO: A&Ox3, normal strength, nl sensation throughout, normal speech.   PSYCH: Calm, cooperative, normal affect and interaction.

## 2022-09-23 NOTE — ED PROVIDER NOTE - ATTENDING APP SHARED VISIT CONTRIBUTION OF CARE
I personally evaluated the patient. I reviewed the Resident´s or Physician Assistant´s note (as assigned above), and agree with the findings and plan except as documented in my note.    42-year-old male presents to emergency department complaining of flulike symptoms.  Has recent diagnosis of positive coronavirus status several days ago.  Admits to intractable nausea, inability to eat, worsening body aches and chills, and constitutional derangements including being mostly bedridden for several days.  No other symptoms.  Denies urinary symptoms, diarrhea or vomiting, chest pain.  Admits to right low back pain, for several days, unchanged, constant, no resolving or provoking factors    The review of systems is otherwise unremarkable    GENERAL: male in no distress.  Appears ill but not toxic  HEENT: EOMI non icteric no facial droop  NECK: FROM no meningismus  CHEST: normal work of breathing noted. CTA bilateral.   CV: pulses intact S1S2 regular  ABD: soft, non rigid, non distended no rebound  JENA K: Right paravertebral soft tissue tenderness to palpation without warmth.  No midline tenderness or step-off, no deformities  EXTR: FROM   NEURO: AAO 3 no focal deficits  SKIN: normal no pallor  PSYCH: normal mood & mentation    Impression: Influenza-like illness    Plan: IV, labs, imaging, supportive care & reevaluation

## 2022-09-23 NOTE — H&P ADULT - ATTENDING COMMENTS
IRMA ENNIS is a 42y man with a medical history significant for HECTOR DM who presented initially with worsening flu-like symptoms from COVID-19, and is now in the critical care unit for DKA with possible gastric outlet obstruction    Impression    Diabetic Ketoacidosis  Hypovolemia  Hyperkalemia  Gastric outlet obstruction vs Gastroparesis  COVID-19    Plan:      CNS: no acute concerns, awake/alert    HEENT: Oral care    CARDIOVASCULAR: Volume resuscitation to continue with treatment for DKA    PULMONARY:  HOB @ 45 degrees, aspiration precautions  On room air    GASTROINTESTINAL:  Maintain NPO (except ice chips/sips) for possible obstruction and DKA protocol  Surgical consult pending    GENITOURINARY/RENAL  Monitor I&O  Serum creatinine elevation, unclear if JANET vs CKD vs pre-renal azotemia  Fluid resuscitation as above, continue to monitor    INFECTIOUS  No need for inpatient COVID treatments  Avoid steroid for signal towards harm in mild/moderate cases    HEMATOLOGIC  DVT ppx: heparin SQ    ENDOCRINE  DKA secondary to acute infection and medication noncompliance  DKA protocol insulin gtt and fluid resuscitation  q1 POC glucose  q2 BMP until stabilized  recheck ABG s/p fluid resuscitation  IVF may require bicarb addition if patient remains severely acidemic    MSK/DERM  No acute concerns      CODE STATUS: FULL  DISPO: admit to ICU

## 2022-09-23 NOTE — CONSULT NOTE ADULT - SUBJECTIVE AND OBJECTIVE BOX
Patient is a 42y old  Male who presents with a chief complaint of DKA (23 Sep 2022 14:23)      HPI:  41 yo M with PMHX of latent autoimmune diabetes ( DM 1.5) presents to the ED for flu like symptoms after testing for COVID 5 days ago. Patient states that his symptoms started on monday where he was having fevers, shortness of breath and decreased po intake. Because he was not eating patient did not take his diabetes medications. He endorses one episode of vomiting which he had after taking paxlovid yesterday for the first time yesterday. Patient has not had any BM for 4 days now. he does endorse passing gas.     In the ED     T(F): 97.6 (23 Sep 2022 12:49), Max: 97.8 (23 Sep 2022 08:23)  HR: 109 (23 Sep 2022 12:49) (109 - 112)  BP: 120/74 (23 Sep 2022 12:49) (120/74 - 126/79)  BP(mean): 93 (23 Sep 2022 12:49) (93 - 94)  RR: 18 (23 Sep 2022 12:49) (16 - 18)  SpO2: 100%       LABS:                        16.4   18.83 )-----------( 363      ( 23 Sep 2022 09:40 )             51.3       135  |  94<L>  |  23<H>  ----------------------------<  368<H>  6.5<HH>   |  4<LL>  |  1.6<H>    Ca    12.0<H>      23 Sep 2022 09:40  Phos  6.6       Mg     2.4         TPro  7.9  /  Alb  4.7  /  TBili  <0.2  /  DBili  <0.2  /  AST  12  /  ALT  14  /  AlkPhos  115      Beta Hydroxy-Butyrate: >9.0 mmoL/L (22 @ 09:40)     Color: Light Yellow / Appearance: Clear / S.029 / pH: x  Gluc: x / Ketone: Large  / Bili: Negative / Urobili: <2 mg/dL   Blood: x / Protein: 30 mg/dL / Nitrite: Negative   Leuk Esterase: Negative / RBC: 6 /HPF / WBC 1 /HPF   Sq Epi: x / Non Sq Epi: 2 /HPF / Bacteria: Negative    CT abdomen - Severely distended fluid-filled stomach with transition at the duodenum.  This could represent gastric outlet obstruction versus gastroparesis. Bilateral lower lung zone opacities as detailed above. Correlate for infectious/inflammatory etiologies. (23 Sep 2022 14:23)      PAST MEDICAL & SURGICAL HISTORY:  Diabetes          SOCIAL HX:   Smoking        never    FAMILY HISTORY:  :  No known cardiovacular family hisotry     Review Of Systems:     All ROS are negative except per HPI       Allergies    No Known Allergies    Intolerances          PHYSICAL EXAM    ICU Vital Signs Last 24 Hrs  T(C): 36.5 (23 Sep 2022 15:49), Max: 36.8 (23 Sep 2022 15:33)  T(F): 97.7 (23 Sep 2022 15:49), Max: 98.3 (23 Sep 2022 15:33)  HR: 102 (23 Sep 2022 15:49) (98 - 112)  BP: 98/63 (23 Sep 2022 15:49) (98/63 - 126/79)  BP(mean): 81 (23 Sep 2022 15:33) (81 - 94)  ABP: --  ABP(mean): --  RR: 18 (23 Sep 2022 15:49) (16 - 18)  SpO2: 98% (23 Sep 2022 15:49) (98% - 100%)    O2 Parameters below as of 23 Sep 2022 15:49  Patient On (Oxygen Delivery Method): room air            CONSTITUTIONAL:  Well nourished.  NAD    ENT:   Airway patent,   Mouth with normal mucosa.   No thrush    EYES:   pupils equal,   round and reactive to light.    CARDIAC:   Normal rate,   Regular rhythm.    Heart sounds S1, S2.   No edema    RESPIRATORY:   No wheezing   Normal chest expansion  No use of accessory muscles    GASTROINTESTINAL:  Abdomen soft   Non-tender,   No guarding,   + BS    MUSCULOSKELETAL:   Range of motion is not limited,  No clubbing, cyanosis    NEUROLOGICAL:   Alert and oriented   No motor deficits.    SKIN:   Warm and dry  No evidence of rash.    PSYCHIATRIC:   Normal mood and affect.   No apparent risk to self or others.                22 @ 07:01  -  22 @ 16:13  --------------------------------------------------------  IN:    Sodium Chloride 0.9% Bolus: 1000 mL  Total IN: 1000 mL    OUT:  Total OUT: 0 mL    Total NET: 1000 mL          LABS:                          16.4   18.83 )-----------( 363      ( 23 Sep 2022 09:40 )             51.3                                                   135  |  94<L>  |  23<H>  ----------------------------<  368<H>  6.5<HH>   |  4<LL>  |  1.6<H>    Ca    12.0<H>      23 Sep 2022 09:40  Phos  6.6       Mg     2.4         TPro  7.9  /  Alb  4.7  /  TBili  <0.2  /  DBili  <0.2  /  AST  12  /  ALT  14  /  AlkPhos  115                                               Urinalysis Basic - ( 23 Sep 2022 11:27 )    Color: Light Yellow / Appearance: Clear / S.029 / pH: x  Gluc: x / Ketone: Large  / Bili: Negative / Urobili: <2 mg/dL   Blood: x / Protein: 30 mg/dL / Nitrite: Negative   Leuk Esterase: Negative / RBC: 6 /HPF / WBC 1 /HPF   Sq Epi: x / Non Sq Epi: 2 /HPF / Bacteria: Negative                                                  LIVER FUNCTIONS - ( 23 Sep 2022 09:40 )  Alb: 4.7 g/dL / Pro: 7.9 g/dL / ALK PHOS: 115 U/L / ALT: 14 U/L / AST: 12 U/L / GGT: x                                                                                                                                   ABG - ( 23 Sep 2022 13:58 )  pH, Arterial: 7.14  pH, Blood: x     /  pCO2: 14    /  pO2: 113   / HCO3: 5     / Base Excess: -21.8 /  SaO2: 97.4                  MEDICATIONS  (STANDING):  atorvastatin Oral Tab/Cap - Peds 10 milliGRAM(s) Oral daily  chlorhexidine 2% Cloths 1 Application(s) Topical <User Schedule>  dextrose 5% + sodium chloride 0.45% 1000 milliLiter(s) (200 mL/Hr) IV Continuous <Continuous>  heparin   Injectable 5000 Unit(s) SubCutaneous every 12 hours  insulin regular Infusion 4 Unit(s)/Hr (4 mL/Hr) IV Continuous <Continuous>  pantoprazole  Injectable 40 milliGRAM(s) IV Push daily    MEDICATIONS  (PRN):  acetaminophen     Tablet .. 650 milliGRAM(s) Oral every 6 hours PRN Temp greater or equal to 38C (100.4F), Mild Pain (1 - 3)  aluminum hydroxide/magnesium hydroxide/simethicone Suspension 30 milliLiter(s) Oral every 4 hours PRN Dyspepsia  melatonin 3 milliGRAM(s) Oral at bedtime PRN Insomnia  ondansetron Injectable 4 milliGRAM(s) IV Push every 8 hours PRN Nausea and/or Vomiting

## 2022-09-23 NOTE — H&P ADULT - ASSESSMENT
43 yo M with PMHX of latent autoimmune diabetes ( DM 1.5) presents to the ED for flu like symptoms after testing for COVID 5 days ago. Admitted for ICU monitoring for DKA and possible SBO.     IMPRESSION:    DM HECTOR  DKA   SBO? vs Gastroparesis  Mild COVID- 19 PNA - fully vaccinated x3 moderna.     PLAN:    CNS: Avoid sedations    HEENT: Oral care    PULMONARY:  HOB @ 45 degrees.  Aspiration precautions. O2 92-94%    CARDIOVASCULAR: Keep Map > 65. Check CE. Echo    GI: GI prophylaxis. NPO for now. possible need for NGT suction. Surgery cx.    RENAL:  sodium bicarb gtt . monitor K+ trend cr. Follow up lytes.  Correct as needed    INFECTIOUS DISEASE:  CT findings noted - likely covid . check procal, IFM.  ID consult for possible RDV .     HEMATOLOGICAL:  DVT prophylaxis. Va duplex     ENDOCRINE:  Insulin gtt. start D5 + .45% NS with sodium bicarb 200 cc/hr.  dc bicarb with improvement in BMP levels. D50 pushes as needed until gap is closed. Keep NPO. Follow up FS.  Insulin protocol if needed. check hba1c, lipid, tsh.     MUSCULOSKELETAL: OOBTC     MICU

## 2022-09-23 NOTE — H&P ADULT - HISTORY OF PRESENT ILLNESS
41 yo M with PMHX of latent autoimmune diabetes ( DM 1.5) presents to the ED for flu like symptoms after testing for COVID 5 days ago. Patient states that his symptoms started on monday where he was having fevers, shortness of breath and decreased po intake. Because he was not eating patient did not take his diabetes medications. He endorses one episode of vomiting which he had after taking paxlovid yesterday for the first time yesterday. Patient has not had any BM for 4 days now. he does endorse passing gas.     In the ED     T(F): 97.6 (23 Sep 2022 12:49), Max: 97.8 (23 Sep 2022 08:23)  HR: 109 (23 Sep 2022 12:49) (109 - 112)  BP: 120/74 (23 Sep 2022 12:49) (120/74 - 126/79)  BP(mean): 93 (23 Sep 2022 12:49) (93 - 94)  RR: 18 (23 Sep 2022 12:49) (16 - 18)  SpO2: 100%       LABS:                        16.4   18.83 )-----------( 363      ( 23 Sep 2022 09:40 )             51.3       135  |  94<L>  |  23<H>  ----------------------------<  368<H>  6.5<HH>   |  4<LL>  |  1.6<H>    Ca    12.0<H>      23 Sep 2022 09:40  Phos  6.6       Mg     2.4         TPro  7.9  /  Alb  4.7  /  TBili  <0.2  /  DBili  <0.2  /  AST  12  /  ALT  14  /  AlkPhos  115  09-23    Beta Hydroxy-Butyrate: >9.0 mmoL/L (22 @ 09:40)     Color: Light Yellow / Appearance: Clear / S.029 / pH: x  Gluc: x / Ketone: Large  / Bili: Negative / Urobili: <2 mg/dL   Blood: x / Protein: 30 mg/dL / Nitrite: Negative   Leuk Esterase: Negative / RBC: 6 /HPF / WBC 1 /HPF   Sq Epi: x / Non Sq Epi: 2 /HPF / Bacteria: Negative    CT abdomen - Severely distended fluid-filled stomach with transition at the duodenum.  This could represent gastric outlet obstruction versus gastroparesis. Bilateral lower lung zone opacities as detailed above. Correlate for infectious/inflammatory etiologies.

## 2022-09-24 LAB
A1C WITH ESTIMATED AVERAGE GLUCOSE RESULT: 7.9 % — HIGH (ref 4–5.6)
ALBUMIN SERPL ELPH-MCNC: 3.3 G/DL — LOW (ref 3.5–5.2)
ALP SERPL-CCNC: 75 U/L — SIGNIFICANT CHANGE UP (ref 30–115)
ALT FLD-CCNC: 8 U/L — SIGNIFICANT CHANGE UP (ref 0–41)
ANION GAP SERPL CALC-SCNC: 12 MMOL/L — SIGNIFICANT CHANGE UP (ref 7–14)
ANION GAP SERPL CALC-SCNC: 13 MMOL/L — SIGNIFICANT CHANGE UP (ref 7–14)
ANION GAP SERPL CALC-SCNC: 17 MMOL/L — HIGH (ref 7–14)
ANION GAP SERPL CALC-SCNC: 19 MMOL/L — HIGH (ref 7–14)
AST SERPL-CCNC: 8 U/L — SIGNIFICANT CHANGE UP (ref 0–41)
BASOPHILS # BLD AUTO: 0.02 K/UL — SIGNIFICANT CHANGE UP (ref 0–0.2)
BASOPHILS NFR BLD AUTO: 0.2 % — SIGNIFICANT CHANGE UP (ref 0–1)
BILIRUB SERPL-MCNC: 0.3 MG/DL — SIGNIFICANT CHANGE UP (ref 0.2–1.2)
BUN SERPL-MCNC: 12 MG/DL — SIGNIFICANT CHANGE UP (ref 10–20)
BUN SERPL-MCNC: 14 MG/DL — SIGNIFICANT CHANGE UP (ref 10–20)
BUN SERPL-MCNC: 14 MG/DL — SIGNIFICANT CHANGE UP (ref 10–20)
BUN SERPL-MCNC: 9 MG/DL — LOW (ref 10–20)
CALCIUM SERPL-MCNC: 8.6 MG/DL — SIGNIFICANT CHANGE UP (ref 8.4–10.5)
CALCIUM SERPL-MCNC: 9.2 MG/DL — SIGNIFICANT CHANGE UP (ref 8.4–10.5)
CALCIUM SERPL-MCNC: 9.5 MG/DL — SIGNIFICANT CHANGE UP (ref 8.4–10.5)
CALCIUM SERPL-MCNC: 9.6 MG/DL — SIGNIFICANT CHANGE UP (ref 8.4–10.5)
CHLORIDE SERPL-SCNC: 102 MMOL/L — SIGNIFICANT CHANGE UP (ref 98–110)
CHLORIDE SERPL-SCNC: 105 MMOL/L — SIGNIFICANT CHANGE UP (ref 98–110)
CHLORIDE SERPL-SCNC: 109 MMOL/L — SIGNIFICANT CHANGE UP (ref 98–110)
CHLORIDE SERPL-SCNC: 110 MMOL/L — SIGNIFICANT CHANGE UP (ref 98–110)
CHOLEST SERPL-MCNC: 106 MG/DL — SIGNIFICANT CHANGE UP
CO2 SERPL-SCNC: 13 MMOL/L — LOW (ref 17–32)
CO2 SERPL-SCNC: 15 MMOL/L — LOW (ref 17–32)
CO2 SERPL-SCNC: 15 MMOL/L — LOW (ref 17–32)
CO2 SERPL-SCNC: 17 MMOL/L — SIGNIFICANT CHANGE UP (ref 17–32)
CREAT SERPL-MCNC: 0.8 MG/DL — SIGNIFICANT CHANGE UP (ref 0.7–1.5)
CREAT SERPL-MCNC: 0.8 MG/DL — SIGNIFICANT CHANGE UP (ref 0.7–1.5)
CREAT SERPL-MCNC: 0.9 MG/DL — SIGNIFICANT CHANGE UP (ref 0.7–1.5)
CREAT SERPL-MCNC: 0.9 MG/DL — SIGNIFICANT CHANGE UP (ref 0.7–1.5)
EGFR: 109 ML/MIN/1.73M2 — SIGNIFICANT CHANGE UP
EGFR: 109 ML/MIN/1.73M2 — SIGNIFICANT CHANGE UP
EGFR: 113 ML/MIN/1.73M2 — SIGNIFICANT CHANGE UP
EGFR: 113 ML/MIN/1.73M2 — SIGNIFICANT CHANGE UP
EOSINOPHIL # BLD AUTO: 0 K/UL — SIGNIFICANT CHANGE UP (ref 0–0.7)
EOSINOPHIL NFR BLD AUTO: 0 % — SIGNIFICANT CHANGE UP (ref 0–8)
ESTIMATED AVERAGE GLUCOSE: 180 MG/DL — HIGH (ref 68–114)
FERRITIN SERPL-MCNC: 732 NG/ML — HIGH (ref 30–400)
GLUCOSE BLDC GLUCOMTR-MCNC: 123 MG/DL — HIGH (ref 70–99)
GLUCOSE BLDC GLUCOMTR-MCNC: 131 MG/DL — HIGH (ref 70–99)
GLUCOSE BLDC GLUCOMTR-MCNC: 132 MG/DL — HIGH (ref 70–99)
GLUCOSE BLDC GLUCOMTR-MCNC: 134 MG/DL — HIGH (ref 70–99)
GLUCOSE BLDC GLUCOMTR-MCNC: 139 MG/DL — HIGH (ref 70–99)
GLUCOSE BLDC GLUCOMTR-MCNC: 144 MG/DL — HIGH (ref 70–99)
GLUCOSE BLDC GLUCOMTR-MCNC: 192 MG/DL — HIGH (ref 70–99)
GLUCOSE BLDC GLUCOMTR-MCNC: 193 MG/DL — HIGH (ref 70–99)
GLUCOSE BLDC GLUCOMTR-MCNC: 224 MG/DL — HIGH (ref 70–99)
GLUCOSE BLDC GLUCOMTR-MCNC: 240 MG/DL — HIGH (ref 70–99)
GLUCOSE BLDC GLUCOMTR-MCNC: 312 MG/DL — HIGH (ref 70–99)
GLUCOSE SERPL-MCNC: 138 MG/DL — HIGH (ref 70–99)
GLUCOSE SERPL-MCNC: 147 MG/DL — HIGH (ref 70–99)
GLUCOSE SERPL-MCNC: 215 MG/DL — HIGH (ref 70–99)
GLUCOSE SERPL-MCNC: 239 MG/DL — HIGH (ref 70–99)
HCT VFR BLD CALC: 37 % — LOW (ref 42–52)
HDLC SERPL-MCNC: 40 MG/DL — LOW
HGB BLD-MCNC: 12.7 G/DL — LOW (ref 14–18)
IMM GRANULOCYTES NFR BLD AUTO: 0.7 % — HIGH (ref 0.1–0.3)
LIPID PNL WITH DIRECT LDL SERPL: 52 MG/DL — SIGNIFICANT CHANGE UP
LYMPHOCYTES # BLD AUTO: 0.83 K/UL — LOW (ref 1.2–3.4)
LYMPHOCYTES # BLD AUTO: 8.1 % — LOW (ref 20.5–51.1)
MAGNESIUM SERPL-MCNC: 1.5 MG/DL — LOW (ref 1.8–2.4)
MAGNESIUM SERPL-MCNC: 1.9 MG/DL — SIGNIFICANT CHANGE UP (ref 1.8–2.4)
MCHC RBC-ENTMCNC: 29.1 PG — SIGNIFICANT CHANGE UP (ref 27–31)
MCHC RBC-ENTMCNC: 34.3 G/DL — SIGNIFICANT CHANGE UP (ref 32–37)
MCV RBC AUTO: 84.9 FL — SIGNIFICANT CHANGE UP (ref 80–94)
MONOCYTES # BLD AUTO: 0.79 K/UL — HIGH (ref 0.1–0.6)
MONOCYTES NFR BLD AUTO: 7.7 % — SIGNIFICANT CHANGE UP (ref 1.7–9.3)
NEUTROPHILS # BLD AUTO: 8.53 K/UL — HIGH (ref 1.4–6.5)
NEUTROPHILS NFR BLD AUTO: 83.3 % — HIGH (ref 42.2–75.2)
NON HDL CHOLESTEROL: 66 MG/DL — SIGNIFICANT CHANGE UP
NRBC # BLD: 0 /100 WBCS — SIGNIFICANT CHANGE UP (ref 0–0)
PHOSPHATE SERPL-MCNC: 1.3 MG/DL — LOW (ref 2.1–4.9)
PLATELET # BLD AUTO: 244 K/UL — SIGNIFICANT CHANGE UP (ref 130–400)
POTASSIUM SERPL-MCNC: 3.6 MMOL/L — SIGNIFICANT CHANGE UP (ref 3.5–5)
POTASSIUM SERPL-MCNC: 4.1 MMOL/L — SIGNIFICANT CHANGE UP (ref 3.5–5)
POTASSIUM SERPL-MCNC: 4.2 MMOL/L — SIGNIFICANT CHANGE UP (ref 3.5–5)
POTASSIUM SERPL-MCNC: 4.3 MMOL/L — SIGNIFICANT CHANGE UP (ref 3.5–5)
POTASSIUM SERPL-SCNC: 3.6 MMOL/L — SIGNIFICANT CHANGE UP (ref 3.5–5)
POTASSIUM SERPL-SCNC: 4.1 MMOL/L — SIGNIFICANT CHANGE UP (ref 3.5–5)
POTASSIUM SERPL-SCNC: 4.2 MMOL/L — SIGNIFICANT CHANGE UP (ref 3.5–5)
POTASSIUM SERPL-SCNC: 4.3 MMOL/L — SIGNIFICANT CHANGE UP (ref 3.5–5)
PROCALCITONIN SERPL-MCNC: 0.88 NG/ML — HIGH (ref 0.02–0.1)
PROT SERPL-MCNC: 5.5 G/DL — LOW (ref 6–8)
RBC # BLD: 4.36 M/UL — LOW (ref 4.7–6.1)
RBC # FLD: 12.6 % — SIGNIFICANT CHANGE UP (ref 11.5–14.5)
SODIUM SERPL-SCNC: 134 MMOL/L — LOW (ref 135–146)
SODIUM SERPL-SCNC: 135 MMOL/L — SIGNIFICANT CHANGE UP (ref 135–146)
SODIUM SERPL-SCNC: 137 MMOL/L — SIGNIFICANT CHANGE UP (ref 135–146)
SODIUM SERPL-SCNC: 141 MMOL/L — SIGNIFICANT CHANGE UP (ref 135–146)
TRIGL SERPL-MCNC: 69 MG/DL — SIGNIFICANT CHANGE UP
TSH SERPL-MCNC: 0.5 UIU/ML — SIGNIFICANT CHANGE UP (ref 0.27–4.2)
WBC # BLD: 10.24 K/UL — SIGNIFICANT CHANGE UP (ref 4.8–10.8)
WBC # FLD AUTO: 10.24 K/UL — SIGNIFICANT CHANGE UP (ref 4.8–10.8)

## 2022-09-24 PROCEDURE — 74018 RADEX ABDOMEN 1 VIEW: CPT | Mod: 26

## 2022-09-24 PROCEDURE — 99233 SBSQ HOSP IP/OBS HIGH 50: CPT

## 2022-09-24 PROCEDURE — 93970 EXTREMITY STUDY: CPT | Mod: 26

## 2022-09-24 RX ORDER — INSULIN HUMAN 100 [IU]/ML
2 INJECTION, SOLUTION SUBCUTANEOUS
Qty: 100 | Refills: 0 | Status: DISCONTINUED | OUTPATIENT
Start: 2022-09-24 | End: 2022-09-24

## 2022-09-24 RX ORDER — SODIUM CHLORIDE 9 MG/ML
1000 INJECTION, SOLUTION INTRAVENOUS
Refills: 0 | Status: DISCONTINUED | OUTPATIENT
Start: 2022-09-24 | End: 2022-09-24

## 2022-09-24 RX ORDER — GLUCAGON INJECTION, SOLUTION 0.5 MG/.1ML
1 INJECTION, SOLUTION SUBCUTANEOUS ONCE
Refills: 0 | Status: DISCONTINUED | OUTPATIENT
Start: 2022-09-24 | End: 2022-09-27

## 2022-09-24 RX ORDER — DEXTROSE 50 % IN WATER 50 %
15 SYRINGE (ML) INTRAVENOUS ONCE
Refills: 0 | Status: DISCONTINUED | OUTPATIENT
Start: 2022-09-24 | End: 2022-09-24

## 2022-09-24 RX ORDER — INSULIN GLARGINE 100 [IU]/ML
12 INJECTION, SOLUTION SUBCUTANEOUS ONCE
Refills: 0 | Status: COMPLETED | OUTPATIENT
Start: 2022-09-24 | End: 2022-09-24

## 2022-09-24 RX ORDER — SODIUM CHLORIDE 9 MG/ML
1000 INJECTION, SOLUTION INTRAVENOUS
Refills: 0 | Status: DISCONTINUED | OUTPATIENT
Start: 2022-09-24 | End: 2022-09-26

## 2022-09-24 RX ORDER — SODIUM BICARBONATE 1 MEQ/ML
650 SYRINGE (ML) INTRAVENOUS EVERY 8 HOURS
Refills: 0 | Status: DISCONTINUED | OUTPATIENT
Start: 2022-09-24 | End: 2022-09-27

## 2022-09-24 RX ORDER — MAGNESIUM SULFATE 500 MG/ML
2 VIAL (ML) INJECTION
Refills: 0 | Status: COMPLETED | OUTPATIENT
Start: 2022-09-24 | End: 2022-09-25

## 2022-09-24 RX ORDER — INSULIN GLARGINE 100 [IU]/ML
12 INJECTION, SOLUTION SUBCUTANEOUS AT BEDTIME
Refills: 0 | Status: DISCONTINUED | OUTPATIENT
Start: 2022-09-24 | End: 2022-09-24

## 2022-09-24 RX ORDER — INSULIN GLARGINE 100 [IU]/ML
12 INJECTION, SOLUTION SUBCUTANEOUS AT BEDTIME
Refills: 0 | Status: DISCONTINUED | OUTPATIENT
Start: 2022-09-24 | End: 2022-09-27

## 2022-09-24 RX ORDER — POTASSIUM CHLORIDE 20 MEQ
40 PACKET (EA) ORAL ONCE
Refills: 0 | Status: COMPLETED | OUTPATIENT
Start: 2022-09-24 | End: 2022-09-24

## 2022-09-24 RX ORDER — DEXTROSE 50 % IN WATER 50 %
25 SYRINGE (ML) INTRAVENOUS ONCE
Refills: 0 | Status: DISCONTINUED | OUTPATIENT
Start: 2022-09-24 | End: 2022-09-24

## 2022-09-24 RX ORDER — DEXTROSE 50 % IN WATER 50 %
100 SYRINGE (ML) INTRAVENOUS ONCE
Refills: 0 | Status: COMPLETED | OUTPATIENT
Start: 2022-09-24 | End: 2022-09-24

## 2022-09-24 RX ORDER — POTASSIUM PHOSPHATE, MONOBASIC POTASSIUM PHOSPHATE, DIBASIC 236; 224 MG/ML; MG/ML
30 INJECTION, SOLUTION INTRAVENOUS ONCE
Refills: 0 | Status: COMPLETED | OUTPATIENT
Start: 2022-09-24 | End: 2022-09-24

## 2022-09-24 RX ORDER — INSULIN LISPRO 100/ML
4 VIAL (ML) SUBCUTANEOUS
Refills: 0 | Status: DISCONTINUED | OUTPATIENT
Start: 2022-09-24 | End: 2022-09-27

## 2022-09-24 RX ORDER — INSULIN LISPRO 100/ML
VIAL (ML) SUBCUTANEOUS
Refills: 0 | Status: DISCONTINUED | OUTPATIENT
Start: 2022-09-24 | End: 2022-09-24

## 2022-09-24 RX ORDER — INSULIN LISPRO 100/ML
4 VIAL (ML) SUBCUTANEOUS
Refills: 0 | Status: DISCONTINUED | OUTPATIENT
Start: 2022-09-24 | End: 2022-09-24

## 2022-09-24 RX ORDER — DEXTROSE 50 % IN WATER 50 %
12.5 SYRINGE (ML) INTRAVENOUS ONCE
Refills: 0 | Status: DISCONTINUED | OUTPATIENT
Start: 2022-09-24 | End: 2022-09-24

## 2022-09-24 RX ORDER — GUAIFENESIN/DEXTROMETHORPHAN 600MG-30MG
10 TABLET, EXTENDED RELEASE 12 HR ORAL EVERY 6 HOURS
Refills: 0 | Status: DISCONTINUED | OUTPATIENT
Start: 2022-09-24 | End: 2022-09-27

## 2022-09-24 RX ADMIN — Medication 650 MILLIGRAM(S): at 17:02

## 2022-09-24 RX ADMIN — HEPARIN SODIUM 5000 UNIT(S): 5000 INJECTION INTRAVENOUS; SUBCUTANEOUS at 06:51

## 2022-09-24 RX ADMIN — Medication 100 MILLILITER(S): at 21:04

## 2022-09-24 RX ADMIN — SODIUM CHLORIDE 150 MILLILITER(S): 9 INJECTION, SOLUTION INTRAVENOUS at 17:01

## 2022-09-24 RX ADMIN — Medication 40 MILLIEQUIVALENT(S): at 22:54

## 2022-09-24 RX ADMIN — INSULIN GLARGINE 12 UNIT(S): 100 INJECTION, SOLUTION SUBCUTANEOUS at 22:55

## 2022-09-24 RX ADMIN — INSULIN GLARGINE 12 UNIT(S): 100 INJECTION, SOLUTION SUBCUTANEOUS at 01:39

## 2022-09-24 RX ADMIN — Medication 100 MILLIGRAM(S): at 21:05

## 2022-09-24 RX ADMIN — PANTOPRAZOLE SODIUM 40 MILLIGRAM(S): 20 TABLET, DELAYED RELEASE ORAL at 12:17

## 2022-09-24 RX ADMIN — ATORVASTATIN CALCIUM 10 MILLIGRAM(S): 80 TABLET, FILM COATED ORAL at 12:17

## 2022-09-24 RX ADMIN — Medication 650 MILLIGRAM(S): at 21:15

## 2022-09-24 RX ADMIN — Medication 650 MILLIGRAM(S): at 21:54

## 2022-09-24 RX ADMIN — Medication 4 UNIT(S): at 12:10

## 2022-09-24 RX ADMIN — SODIUM CHLORIDE 100 MILLILITER(S): 9 INJECTION, SOLUTION INTRAVENOUS at 07:35

## 2022-09-24 RX ADMIN — Medication 2: at 12:10

## 2022-09-24 RX ADMIN — HEPARIN SODIUM 5000 UNIT(S): 5000 INJECTION INTRAVENOUS; SUBCUTANEOUS at 18:04

## 2022-09-24 RX ADMIN — Medication 10 MILLILITER(S): at 21:04

## 2022-09-24 RX ADMIN — INSULIN HUMAN 2 UNIT(S)/HR: 100 INJECTION, SOLUTION SUBCUTANEOUS at 22:16

## 2022-09-24 RX ADMIN — INSULIN HUMAN 4 UNIT(S)/HR: 100 INJECTION, SOLUTION SUBCUTANEOUS at 17:00

## 2022-09-24 RX ADMIN — Medication 25 GRAM(S): at 22:55

## 2022-09-24 NOTE — PROGRESS NOTE ADULT - ASSESSMENT
IMPRESSION:    Severe DKA improved  JANET resolved  COVID on RA  Gastroparesis less likely gastric outlet obstruction      PLAN:    CNS: Avoid CNS depressants    HEENT: Oral care    PULMONARY:  HOB @ 45 degrees. Aspiration precautions. Target SpO2 92-96%, titrate oxygen as tolerated.    CARDIO: IVF ++    GI: clear liquid for now    RENAL: trend CMP    INFECTIOUS DISEASE: Follow up cultures. No abx for now. ID eval, consider RZD    HEMATOLOGICAL: DVT prophylaxis: Heparin subq    ENDOCRINE: Fup FS, might need to go back on insulin drip    MUSCULOSKELETAL: Increase ambulation as tolerated.     MICU monitoring for now

## 2022-09-24 NOTE — PROGRESS NOTE ADULT - ASSESSMENT
ASSESSMENT:  42yM w/ PMHx of latent autoimmune diabetes admitted for DKA. Patient reports testing positive for COVID 5 days go and since then has been having SOB, lethargy, decrease PO intake, and nausea. Patient was not taking his DM medications due to not eating. Last BM was 4 days ago but endorses passing flatus. Surgery consulted to r/o GOO. Physical exam findings, imaging, and labs as documented above.     PLAN:  -Gastric distention likely due to gastroparesis  -Pt seen and examined at bedside on rounds. Patient reports one bowel movement this morning, and flatus. Patient denies nausea and/or vomiting.   -Please obtain follow up KUB  -If patient vomits, will place NGT for gastric decompression  -Follow up GI evaluation   -Insuline gtt, strict glucose control   -Endocrine on board, recommendations appreciated   -Surgery to follow     Trauma team [phone] ASSESSMENT:  42yM w/ PMHx of latent autoimmune diabetes admitted for DKA. Patient reports testing positive for COVID 5 days go and since then has been having SOB, lethargy, decrease PO intake, and nausea. Patient was not taking his DM medications due to not eating. Last BM was 4 days ago but endorses passing flatus. Surgery consulted to r/o GOO. Physical exam findings, imaging, and labs as documented above.     PLAN:  -Gastric distention likely due to gastroparesis  -Pt seen and examined at bedside on rounds. Patient reports one bowel movement this morning, and flatus. Patient denies nausea and/or vomiting.   -May begin clear liquid diet and assess if pt is tolerating  -Please obtain follow up KUB  -If patient vomits, will place NGT for gastric decompression  -Follow up GI evaluation   -Insuline gtt, strict glucose control   -Endocrine on board, recommendations appreciated   -Surgery to follow     Trauma team [phone]

## 2022-09-25 LAB
ALBUMIN SERPL ELPH-MCNC: 2.9 G/DL — LOW (ref 3.5–5.2)
ALP SERPL-CCNC: 65 U/L — SIGNIFICANT CHANGE UP (ref 30–115)
ALT FLD-CCNC: 7 U/L — SIGNIFICANT CHANGE UP (ref 0–41)
ANION GAP SERPL CALC-SCNC: 11 MMOL/L — SIGNIFICANT CHANGE UP (ref 7–14)
ANION GAP SERPL CALC-SCNC: 12 MMOL/L — SIGNIFICANT CHANGE UP (ref 7–14)
ANION GAP SERPL CALC-SCNC: 14 MMOL/L — SIGNIFICANT CHANGE UP (ref 7–14)
ANION GAP SERPL CALC-SCNC: 9 MMOL/L — SIGNIFICANT CHANGE UP (ref 7–14)
AST SERPL-CCNC: 8 U/L — SIGNIFICANT CHANGE UP (ref 0–41)
BILIRUB DIRECT SERPL-MCNC: <0.2 MG/DL — SIGNIFICANT CHANGE UP (ref 0–0.3)
BILIRUB INDIRECT FLD-MCNC: >0.1 MG/DL — LOW (ref 0.2–1.2)
BILIRUB SERPL-MCNC: 0.3 MG/DL — SIGNIFICANT CHANGE UP (ref 0.2–1.2)
BUN SERPL-MCNC: 7 MG/DL — LOW (ref 10–20)
BUN SERPL-MCNC: 8 MG/DL — LOW (ref 10–20)
CALCIUM SERPL-MCNC: 8.1 MG/DL — LOW (ref 8.4–10.5)
CALCIUM SERPL-MCNC: 8.2 MG/DL — LOW (ref 8.4–10.5)
CALCIUM SERPL-MCNC: 8.5 MG/DL — SIGNIFICANT CHANGE UP (ref 8.4–10.5)
CALCIUM SERPL-MCNC: 8.9 MG/DL — SIGNIFICANT CHANGE UP (ref 8.4–10.5)
CHLORIDE SERPL-SCNC: 105 MMOL/L — SIGNIFICANT CHANGE UP (ref 98–110)
CHLORIDE SERPL-SCNC: 106 MMOL/L — SIGNIFICANT CHANGE UP (ref 98–110)
CO2 SERPL-SCNC: 19 MMOL/L — SIGNIFICANT CHANGE UP (ref 17–32)
CO2 SERPL-SCNC: 20 MMOL/L — SIGNIFICANT CHANGE UP (ref 17–32)
CO2 SERPL-SCNC: 21 MMOL/L — SIGNIFICANT CHANGE UP (ref 17–32)
CO2 SERPL-SCNC: 22 MMOL/L — SIGNIFICANT CHANGE UP (ref 17–32)
CREAT SERPL-MCNC: 0.6 MG/DL — LOW (ref 0.7–1.5)
CREAT SERPL-MCNC: 0.7 MG/DL — SIGNIFICANT CHANGE UP (ref 0.7–1.5)
CREAT SERPL-MCNC: 0.7 MG/DL — SIGNIFICANT CHANGE UP (ref 0.7–1.5)
CREAT SERPL-MCNC: 0.8 MG/DL — SIGNIFICANT CHANGE UP (ref 0.7–1.5)
EGFR: 113 ML/MIN/1.73M2 — SIGNIFICANT CHANGE UP
EGFR: 118 ML/MIN/1.73M2 — SIGNIFICANT CHANGE UP
EGFR: 118 ML/MIN/1.73M2 — SIGNIFICANT CHANGE UP
EGFR: 124 ML/MIN/1.73M2 — SIGNIFICANT CHANGE UP
GLUCOSE BLDC GLUCOMTR-MCNC: 149 MG/DL — HIGH (ref 70–99)
GLUCOSE BLDC GLUCOMTR-MCNC: 161 MG/DL — HIGH (ref 70–99)
GLUCOSE BLDC GLUCOMTR-MCNC: 180 MG/DL — HIGH (ref 70–99)
GLUCOSE BLDC GLUCOMTR-MCNC: 196 MG/DL — HIGH (ref 70–99)
GLUCOSE BLDC GLUCOMTR-MCNC: 224 MG/DL — HIGH (ref 70–99)
GLUCOSE BLDC GLUCOMTR-MCNC: 227 MG/DL — HIGH (ref 70–99)
GLUCOSE BLDC GLUCOMTR-MCNC: 378 MG/DL — HIGH (ref 70–99)
GLUCOSE SERPL-MCNC: 176 MG/DL — HIGH (ref 70–99)
GLUCOSE SERPL-MCNC: 186 MG/DL — HIGH (ref 70–99)
GLUCOSE SERPL-MCNC: 209 MG/DL — HIGH (ref 70–99)
GLUCOSE SERPL-MCNC: 295 MG/DL — HIGH (ref 70–99)
HCT VFR BLD CALC: 33.1 % — LOW (ref 42–52)
HGB BLD-MCNC: 11.8 G/DL — LOW (ref 14–18)
INR BLD: 1.11 RATIO — SIGNIFICANT CHANGE UP (ref 0.65–1.3)
MAGNESIUM SERPL-MCNC: 2.1 MG/DL — SIGNIFICANT CHANGE UP (ref 1.8–2.4)
MAGNESIUM SERPL-MCNC: 2.6 MG/DL — HIGH (ref 1.8–2.4)
MCHC RBC-ENTMCNC: 29.4 PG — SIGNIFICANT CHANGE UP (ref 27–31)
MCHC RBC-ENTMCNC: 35.6 G/DL — SIGNIFICANT CHANGE UP (ref 32–37)
MCV RBC AUTO: 82.5 FL — SIGNIFICANT CHANGE UP (ref 80–94)
NRBC # BLD: 0 /100 WBCS — SIGNIFICANT CHANGE UP (ref 0–0)
PHOSPHATE SERPL-MCNC: 1.5 MG/DL — LOW (ref 2.1–4.9)
PHOSPHATE SERPL-MCNC: 2.7 MG/DL — SIGNIFICANT CHANGE UP (ref 2.1–4.9)
PLATELET # BLD AUTO: 183 K/UL — SIGNIFICANT CHANGE UP (ref 130–400)
POTASSIUM SERPL-MCNC: 3.6 MMOL/L — SIGNIFICANT CHANGE UP (ref 3.5–5)
POTASSIUM SERPL-MCNC: 3.8 MMOL/L — SIGNIFICANT CHANGE UP (ref 3.5–5)
POTASSIUM SERPL-MCNC: 3.9 MMOL/L — SIGNIFICANT CHANGE UP (ref 3.5–5)
POTASSIUM SERPL-MCNC: 3.9 MMOL/L — SIGNIFICANT CHANGE UP (ref 3.5–5)
POTASSIUM SERPL-SCNC: 3.6 MMOL/L — SIGNIFICANT CHANGE UP (ref 3.5–5)
POTASSIUM SERPL-SCNC: 3.8 MMOL/L — SIGNIFICANT CHANGE UP (ref 3.5–5)
POTASSIUM SERPL-SCNC: 3.9 MMOL/L — SIGNIFICANT CHANGE UP (ref 3.5–5)
POTASSIUM SERPL-SCNC: 3.9 MMOL/L — SIGNIFICANT CHANGE UP (ref 3.5–5)
PROT SERPL-MCNC: 4.8 G/DL — LOW (ref 6–8)
PROTHROM AB SERPL-ACNC: 12.7 SEC — SIGNIFICANT CHANGE UP (ref 9.95–12.87)
RBC # BLD: 4.01 M/UL — LOW (ref 4.7–6.1)
RBC # FLD: 12.6 % — SIGNIFICANT CHANGE UP (ref 11.5–14.5)
SODIUM SERPL-SCNC: 137 MMOL/L — SIGNIFICANT CHANGE UP (ref 135–146)
SODIUM SERPL-SCNC: 138 MMOL/L — SIGNIFICANT CHANGE UP (ref 135–146)
WBC # BLD: 9.23 K/UL — SIGNIFICANT CHANGE UP (ref 4.8–10.8)
WBC # FLD AUTO: 9.23 K/UL — SIGNIFICANT CHANGE UP (ref 4.8–10.8)

## 2022-09-25 PROCEDURE — 71045 X-RAY EXAM CHEST 1 VIEW: CPT | Mod: 26

## 2022-09-25 PROCEDURE — 99233 SBSQ HOSP IP/OBS HIGH 50: CPT

## 2022-09-25 RX ORDER — REMDESIVIR 5 MG/ML
INJECTION INTRAVENOUS
Refills: 0 | Status: DISCONTINUED | OUTPATIENT
Start: 2022-09-25 | End: 2022-09-27

## 2022-09-25 RX ORDER — REMDESIVIR 5 MG/ML
100 INJECTION INTRAVENOUS EVERY 24 HOURS
Refills: 0 | Status: DISCONTINUED | OUTPATIENT
Start: 2022-09-26 | End: 2022-09-27

## 2022-09-25 RX ORDER — INSULIN LISPRO 100/ML
10 VIAL (ML) SUBCUTANEOUS ONCE
Refills: 0 | Status: COMPLETED | OUTPATIENT
Start: 2022-09-25 | End: 2022-09-25

## 2022-09-25 RX ORDER — INSULIN LISPRO 100/ML
4 VIAL (ML) SUBCUTANEOUS ONCE
Refills: 0 | Status: COMPLETED | OUTPATIENT
Start: 2022-09-25 | End: 2022-09-25

## 2022-09-25 RX ORDER — IBUPROFEN 200 MG
600 TABLET ORAL ONCE
Refills: 0 | Status: COMPLETED | OUTPATIENT
Start: 2022-09-25 | End: 2022-09-25

## 2022-09-25 RX ORDER — INSULIN LISPRO 100/ML
VIAL (ML) SUBCUTANEOUS
Refills: 0 | Status: DISCONTINUED | OUTPATIENT
Start: 2022-09-25 | End: 2022-09-27

## 2022-09-25 RX ORDER — INFLUENZA VIRUS VACCINE 15; 15; 15; 15 UG/.5ML; UG/.5ML; UG/.5ML; UG/.5ML
0.5 SUSPENSION INTRAMUSCULAR ONCE
Refills: 0 | Status: DISCONTINUED | OUTPATIENT
Start: 2022-09-25 | End: 2022-09-27

## 2022-09-25 RX ORDER — PANTOPRAZOLE SODIUM 20 MG/1
40 TABLET, DELAYED RELEASE ORAL
Refills: 0 | Status: DISCONTINUED | OUTPATIENT
Start: 2022-09-26 | End: 2022-09-27

## 2022-09-25 RX ORDER — REMDESIVIR 5 MG/ML
200 INJECTION INTRAVENOUS EVERY 24 HOURS
Refills: 0 | Status: COMPLETED | OUTPATIENT
Start: 2022-09-25 | End: 2022-09-25

## 2022-09-25 RX ADMIN — Medication 100 MILLIGRAM(S): at 21:38

## 2022-09-25 RX ADMIN — Medication 600 MILLIGRAM(S): at 21:38

## 2022-09-25 RX ADMIN — Medication 4 UNIT(S): at 09:32

## 2022-09-25 RX ADMIN — Medication 600 MILLIGRAM(S): at 14:30

## 2022-09-25 RX ADMIN — Medication 10: at 17:50

## 2022-09-25 RX ADMIN — Medication 650 MILLIGRAM(S): at 13:16

## 2022-09-25 RX ADMIN — Medication 10 MILLILITER(S): at 03:22

## 2022-09-25 RX ADMIN — Medication 25 GRAM(S): at 00:58

## 2022-09-25 RX ADMIN — CHLORHEXIDINE GLUCONATE 1 APPLICATION(S): 213 SOLUTION TOPICAL at 05:52

## 2022-09-25 RX ADMIN — Medication 600 MILLIGRAM(S): at 22:08

## 2022-09-25 RX ADMIN — Medication 100 MILLIGRAM(S): at 06:02

## 2022-09-25 RX ADMIN — Medication 4 UNIT(S): at 22:07

## 2022-09-25 RX ADMIN — Medication 10 MILLILITER(S): at 13:16

## 2022-09-25 RX ADMIN — INSULIN GLARGINE 12 UNIT(S): 100 INJECTION, SOLUTION SUBCUTANEOUS at 21:38

## 2022-09-25 RX ADMIN — Medication 650 MILLIGRAM(S): at 06:02

## 2022-09-25 RX ADMIN — HEPARIN SODIUM 5000 UNIT(S): 5000 INJECTION INTRAVENOUS; SUBCUTANEOUS at 06:02

## 2022-09-25 RX ADMIN — Medication 10 MILLILITER(S): at 21:37

## 2022-09-25 RX ADMIN — Medication 4 UNIT(S): at 11:44

## 2022-09-25 RX ADMIN — ATORVASTATIN CALCIUM 10 MILLIGRAM(S): 80 TABLET, FILM COATED ORAL at 11:44

## 2022-09-25 RX ADMIN — HEPARIN SODIUM 5000 UNIT(S): 5000 INJECTION INTRAVENOUS; SUBCUTANEOUS at 17:49

## 2022-09-25 RX ADMIN — Medication 4 UNIT(S): at 17:49

## 2022-09-25 RX ADMIN — POTASSIUM PHOSPHATE, MONOBASIC POTASSIUM PHOSPHATE, DIBASIC 83.33 MILLIMOLE(S): 236; 224 INJECTION, SOLUTION INTRAVENOUS at 01:43

## 2022-09-25 RX ADMIN — Medication 100 MILLIGRAM(S): at 13:16

## 2022-09-25 RX ADMIN — Medication 600 MILLIGRAM(S): at 06:02

## 2022-09-25 RX ADMIN — Medication 600 MILLIGRAM(S): at 15:00

## 2022-09-25 RX ADMIN — Medication 650 MILLIGRAM(S): at 21:38

## 2022-09-25 RX ADMIN — Medication 25 GRAM(S): at 03:17

## 2022-09-25 RX ADMIN — REMDESIVIR 500 MILLIGRAM(S): 5 INJECTION INTRAVENOUS at 13:16

## 2022-09-25 RX ADMIN — PANTOPRAZOLE SODIUM 40 MILLIGRAM(S): 20 TABLET, DELAYED RELEASE ORAL at 11:44

## 2022-09-25 NOTE — PROGRESS NOTE ADULT - ASSESSMENT
ASSESSMENT:  42yM w/ PMHx of latent autoimmune diabetes admitted for DKA. Patient reports testing positive for COVID 5 days go and since then has been having SOB, lethargy, decrease PO intake, and nausea. Patient was not taking his DM medications due to not eating. Last BM was 4 days ago but endorses passing flatus. Surgery consulted to r/o GOO. Physical exam findings, imaging, and labs as documented above.     PLAN:  -Gastric distention likely due to gastroparesis  -Pt seen and examined at bedside on rounds. Patient reports one bowel movement this morning, and flatus. Patient denies nausea and/or vomiting.   -May begin clear liquid diet and assess if pt is tolerating  -Please obtain follow up KUB  -If patient vomits, will place NGT for gastric decompression  -Follow up GI evaluation   -Insuline gtt, strict glucose control   -Endocrine on board, recommendations appreciated   -Surgery to follow     Trauma team [phone]

## 2022-09-25 NOTE — PATIENT PROFILE ADULT - FALL HARM RISK - HARM RISK INTERVENTIONS

## 2022-09-25 NOTE — PROGRESS NOTE ADULT - ASSESSMENT
Sever DKA, HECTOR   - a1c 7.9%, patient was diagnosed with HECTOR and recently was started on tresiba ( around 2-3 months ago ) takes 8 units at bedtime , but also on jardiance 10 mg daily and janumet 100/1000 mg BID . follows with endocrinology in [place]   - he stopped taking all his DM meds as he was not eating well since getting COVID and was concerned about low BG , which likely precipitated the DKA plus possible Gi obstruction   - DKA resolved and transitioned of drip with Lantus 12 units , continue Lantus 12 units daily and lispro 4 units TIDAC   - can titrate up if above target   - discussed with patient on discharge he will need basal / bolus insulin given acute and sever DKA on presentation  , discharge on Tresiba and Humalog and discontinue jardiance and janumet   - he will follow up with primary endocrinologist for further learning about carb counting and possible insulin pump , all questions answered

## 2022-09-25 NOTE — PROGRESS NOTE ADULT - ASSESSMENT
IMPRESSION:    Severe DKA improved  JANET resolved  COVID on RA 94%  Gastroparesis less likely gastric outlet obstruction      PLAN:    CNS: Avoid CNS depressants    HEENT: Oral care    PULMONARY:  HOB @ 45 degrees. Aspiration precautions. Target SpO2 92-96%, POX on RA, repeat CXR    CARDIO: IVF +    GI: clear liquid advance as tolerated    RENAL: trend CMP    INFECTIOUS DISEASE: Follow up cultures. No abx for now. ID eval , rzd    HEMATOLOGICAL: DVT prophylaxis: Heparin subq    ENDOCRINE: Fup FS, might need to go back on insulin drip    MUSCULOSKELETAL: Increase ambulation as tolerated.     AAT

## 2022-09-25 NOTE — CONSULT NOTE ADULT - SUBJECTIVE AND OBJECTIVE BOX
MARLAIRMA YEBOAH  42y, Male  Allergy: No Known Allergies      CHIEF COMPLAINT: DKA (25 Sep 2022 01:09)      LOS  2d    HPI:  43 yo M with PMHX of latent autoimmune diabetes ( DM 1.5) presents to the ED for flu like symptoms after testing for COVID 5 days ago. Patient states that his symptoms started on monday where he was having fevers, shortness of breath and decreased po intake. Because he was not eating patient did not take his diabetes medications. He endorses one episode of vomiting which he had after taking paxlovid yesterday for the first time yesterday. Patient has not had any BM for 4 days now. he does endorse passing gas.    In the ED    T(F): 97.6 (23 Sep 2022 12:49), Max: 97.8 (23 Sep 2022 08:23)  HR: 109 (23 Sep 2022 12:49) (109 - 112)  BP: 120/74 (23 Sep 2022 12:49) (120/74 - 126/79)  BP(mean): 93 (23 Sep 2022 12:49) (93 - 94)  RR: 18 (23 Sep 2022 12:49) (16 - 18)  SpO2: 100%      LABS:                        16.4  18.83 )-----------( 363      ( 23 Sep 2022 09:40 )             51.3      135  |  94<L>  |  23<H>  ----------------------------<  368<H>  6.5<HH>   |  4<LL>  |  1.6<H>    Ca    12.0<H>      23 Sep 2022 09:40  Phos  6.6       Mg     2.4         TPro  7.9  /  Alb  4.7  /  TBili  <0.2  /  DBili  <0.2  /  AST  12  /  ALT  14  /  AlkPhos  115      Beta Hydroxy-Butyrate: >9.0 mmoL/L (22 @ 09:40)    Color: Light Yellow / Appearance: Clear / S.029 / pH: x  Gluc: x / Ketone: Large  / Bili: Negative / Urobili: <2 mg/dL  Blood: x / Protein: 30 mg/dL / Nitrite: Negative  Leuk Esterase: Negative / RBC: 6 /HPF / WBC 1 /HPF  Sq Epi: x / Non Sq Epi: 2 /HPF / Bacteria: Negative    CT abdomen - Severely distended fluid-filled stomach with transition at the duodenum.  This could represent gastric outlet obstruction versus gastroparesis. Bilateral lower lung zone opacities as detailed above. Correlate for infectious/inflammatory etiologies. (23 Sep 2022 14:23)      INFECTIOUS DISEASE HISTORY:  History as above.  Tested positive last Monday.   Was given course of paxlovid.   Was only to take for a day.   Continued coughing, sore throat.   On 2L -- monitored on room air during interview and held saturations.     PAST MEDICAL & SURGICAL HISTORY:  Diabetes          FAMILY HISTORY  Family history reviewed and non-contributory    SOCIAL HISTORY  Social History:  Denies etoh use, drug use      ROS  General: Denies rigors, nightsweats  HEENT: Denies headache, rhinorrhea, sore throat, eye pain  CV: Denies CP, palpitations  PULM: Denies wheezing, hemoptysis  GI: Denies hematemesis, hematochezia, melena  : Denies discharge, hematuria  MSK: Denies arthralgias, myalgias  SKIN: Denies rash, lesions  NEURO: Denies paresthesias, weakness  PSYCH: Denies depression, anxiety    VITALS:  T(F): 100.4, Max: 100.9 (22 @ 22:00)  HR: 99  BP: 113/69  RR: 18Vital Signs Last 24 Hrs  T(C): 38 (24 Sep 2022 23:00), Max: 38.3 (24 Sep 2022 22:00)  T(F): 100.4 (24 Sep 2022 23:00), Max: 100.9 (24 Sep 2022 22:00)  HR: 99 (24 Sep 2022 23:00) (82 - 99)  BP: 113/69 (24 Sep 2022 23:00) (95/52 - 113/69)  BP(mean): 85 (24 Sep 2022 20:14) (76 - 85)  RR: 18 (24 Sep 2022 23:00) (15 - 18)  SpO2: 99% (24 Sep 2022 23:00) (95% - 99%)    Parameters below as of 24 Sep 2022 23:00  Patient On (Oxygen Delivery Method): room air        PHYSICAL EXAM:  Gen: NAD, resting in bed  HEENT: Normocephalic, atraumatic  Neck: supple, no lymphadenopathy  CV: Regular rate & regular rhythm  Lungs: decreased BS at bases, no fremitus  Abdomen: Soft, BS present  Ext: Warm, well perfused  Neuro: non focal, awake  Skin: no rash, no erythema  Lines: no phlebitis    TESTS & MEASUREMENTS:                        12.7  10.24 )-----------( 244      ( 24 Sep 2022 06:49 )             37.0        138  |  105  |  8<L>  ----------------------------<  186<H>  3.8   |  19  |  0.7    Ca    8.5      25 Sep 2022 02:10  Phos  1.5       Mg     2.1         TPro  5.5<L>  /  Alb  3.3<L>  /  TBili  0.3  /  DBili  x   /  AST  8   /  ALT  8   /  AlkPhos  75        LIVER FUNCTIONS - ( 24 Sep 2022 06:49 )  Alb: 3.3 g/dL / Pro: 5.5 g/dL / ALK PHOS: 75 U/L / ALT: 8 U/L / AST: 8 U/L / GGT: x          Urinalysis Basic - ( 23 Sep 2022 11:27 )    Color: Light Yellow / Appearance: Clear / S.029 / pH: x  Gluc: x / Ketone: Large  / Bili: Negative / Urobili: <2 mg/dL  Blood: x / Protein: 30 mg/dL / Nitrite: Negative  Leuk Esterase: Negative / RBC: 6 /HPF / WBC 1 /HPF  Sq Epi: x / Non Sq Epi: 2 /HPF / Bacteria: Negative          Blood Gas Venous - Lactate: 2.40 mmol/L (22 @ 09:40)      INFECTIOUS DISEASES TESTING  Procalcitonin, Serum: 0.88 ng/mL (22 @ 16:35)  COVID-19 PCR: Detected (22 @ 09:30)      RADIOLOGY & ADDITIONAL TESTS:  I have personally reviewed the last Chest xray  CXR  Xray Chest 1 View- PORTABLE-Urgent:  ACC: [id number] EXAM:  XR CHEST PORTABLE URGENT 1V                          PROCEDURE DATE:  2022          INTERPRETATION:  Clinical History / Reason for exam: Cough    Comparison : Chest radiograph None.    Technique/Positioning: Frontal chestradiograph.    Findings:    Support devices: None.    Cardiac/mediastinum/hilum: Unremarkable.    Lung parenchyma/Pleura: Left basilar opacity, may be of infectious  etiology in the appropriate clinical setting. No pneumothorax.    Skeleton/soft tissues: Unremarkable.    Impression:    Left basilar opacity, may be of infectious etiology in the appropriate  clinical setting. Recommend follow-up to resolution.    --- End of Report ---            EVANGELISTA FERRARI MD; Attending Radiologist  This document has been electronically signed. Sep 23 2022 12:17PM (22 @ 09:44)      CT  CT Abdomen and Pelvis w/ IV Cont:  ACC: [id number] EXAM:  CT ABDOMEN AND PELVIS IC                          PROCEDURE DATE:  2022          INTERPRETATION:  CLINICAL STATEMENT: Uncomplicated pyelonephritis    TECHNIQUE: Contiguous axial CT images were obtained from the lower chest  to the pubic symphysis following administration of 100cc Optiray 320  intravenous contrast.  Oral contrast was not administered.  Reformatted  images in the coronal and sagittal planes were acquired.    COMPARISON CT: None.    OTHER STUDIES USEDFOR CORRELATION: None.      FINDINGS:    LOWER CHEST: 1 cm nodular airspace opacity in the right lower lobe  (/). Few faint groundglass tree-in-bud nodules seen in the lingula as  well as focal airspace opacities in the lingula and the anterior left  lower lobe. Correlate for infectious/inflammatory etiologies.    HEPATOBILIARY: Unremarkable.    SPLEEN: Unremarkable.    PANCREAS: Unremarkable.    ADRENAL GLANDS: Unremarkable.    KIDNEYS: Symmetric renal enhancement. No hydronephrosis. No perinephric  fluid collection or asymmetric perinephric fat stranding.    ABDOMINOPELVIC NODES: Unremarkable.    PELVIC ORGANS: Mild bladder distention. No bladder wall thickening or  pericystic fat stranding.    PERITONEUM/MESENTERY/BOWEL: Severely distended fluid-filled stomach with  transition at the duodenum.  This could represent gastric outlet  obstruction versus gastroparesis. No small bowel obstruction free fluid  or free air. The appendix is not definitively visualized however there  are no secondary signs of appendicitis.    BONES/SOFT TISSUES: Unremarkable.      IMPRESSION:    No CT evidence of pyelonephritis.    Severely distended fluid-filled stomach with transition at the duodenum.    This could represent gastric outlet obstruction versus gastroparesis.    Bilateral lower lung zone opacities as detailed above. Correlate for  infectious/inflammatory etiologies.    --- End of Report ---            CARLOS RED MD; Attending Radiologist  This document has been electronically signed. Sep 23 2022 11:28AM (22 @ 10:50)      CARDIOLOGY TESTING  12 Lead ECG:  Ventricular Rate 104 BPM    Atrial Rate 104 BPM    P-R Interval 154 ms    QRS Duration 88 ms    Q-T Interval 440 ms    QTC Calculation(Bazett) 578 ms    P Axis 54 degrees    R Axis 266 degrees    T Axis 76 degrees    Diagnosis Line Sinus tachycardia  Right superior axis deviation  Anterior infarct , age undetermined  Prolonged QT  Abnormal ECG    Confirmed by PIPPA GUILLEN MD ([id number]) on 2022 1:08:10 PM (22 @ 11:54)      MEDICATIONS  atorvastatin Oral Tab/Cap - Peds 10 Oral daily  benzonatate 100 Oral three times a day  chlorhexidine 2% Cloths 1 Topical <User Schedule>  glucagon  Injectable 1 IntraMuscular once  heparin   Injectable 5000 SubCutaneous every 12 hours  insulin glargine Injectable (LANTUS) 12 SubCutaneous at bedtime  insulin lispro Injectable (ADMELOG) 4 SubCutaneous three times a day before meals  lactated ringers. 1000 IV Continuous <Continuous>  pantoprazole  Injectable 40 IV Push daily  sodium bicarbonate 650 Oral every 8 hours      Weight  Weight (kg): 68 (22 @ 08:23)    ANTIBIOTICS:      ALLERGIES:  No Known Allergies

## 2022-09-25 NOTE — CHART NOTE - NSCHARTNOTEFT_GEN_A_CORE
Transfer Note    Transfer from: ICU  Transfer to:  SDU  Accepting physician:    Hospital Course: 43 yo M with PMHX of latent autoimmune diabetes ( DM 1.5) presented to the ED for flu like symptoms after testing for COVID 5 days ago. Patient states that his symptoms started on monday where he was having fevers, shortness of breath and decreased po intake. Because he was not eating patient did not take his diabetes medications. He endorses one episode of vomiting which he had after taking paxlovid yesterday for the first time yesterday. Patient has not had any BM for 4 days now. he does endorse passing gas.     In the ED     T(F): 97.6 (23 Sep 2022 12:49), Max: 97.8 (23 Sep 2022 08:23)  HR: 109 (23 Sep 2022 12:49) (109 - 112)  BP: 120/74 (23 Sep 2022 12:49) (120/74 - 126/79)  BP(mean): 93 (23 Sep 2022 12:49) (93 - 94)  RR: 18 (23 Sep 2022 12:49) (16 - 18)  SpO2: 100%   Beta Hydroxy-Butyrate: >9.0 mmoL/L (0922 @ 09:40)     Color: Light Yellow / Appearance: Clear / S.029 / pH: x  Gluc: x / Ketone: Large  / Bili: Negative / Urobili: <2 mg/dL   Blood: x / Protein: 30 mg/dL / Nitrite: Negative   Leuk Esterase: Negative / RBC: 6 /HPF / WBC 1 /HPF   Sq Epi: x / Non Sq Epi: 2 /HPF / Bacteria: Negative    CT abdomen - Severely distended fluid-filled stomach with transition at the duodenum.  This could represent gastric outlet obstruction versus gastroparesis. Bilateral lower lung zone opacities as detailed above. Correlate for infectious/inflammatory etiologies.    Admitted for ICU monitoring for DKA and possible SBO.     In the ICU  Pt started on insulin drip and then transitioned on insulin Sub Q. Pt's anion gap has closed and DKA has improved.  ID rec RDV 5 day course for covid 19 infection. Pt does not have to finish course of RDV prior to discharge if clinically improving -- can finish course of Paxlovid at home. Pt Satting 96 on RA. Pt tolerating diabetic diet, surgery following, gastric distention likely due gastroparesis. Pt is stable for transfer.        Interim Events:       MEDICATIONS:  STANDING MEDICATIONS  atorvastatin Oral Tab/Cap - Peds 10 milliGRAM(s) Oral daily  benzonatate 100 milliGRAM(s) Oral three times a day  chlorhexidine 2% Cloths 1 Application(s) Topical <User Schedule>  glucagon  Injectable 1 milliGRAM(s) IntraMuscular once  heparin   Injectable 5000 Unit(s) SubCutaneous every 12 hours  ibuprofen  Tablet. 600 milliGRAM(s) Oral once  influenza   Vaccine 0.5 milliLiter(s) IntraMuscular once  insulin glargine Injectable (LANTUS) 12 Unit(s) SubCutaneous at bedtime  insulin lispro (ADMELOG) corrective regimen sliding scale   SubCutaneous three times a day before meals  insulin lispro Injectable (ADMELOG) 4 Unit(s) SubCutaneous three times a day before meals  lactated ringers. 1000 milliLiter(s) IV Continuous <Continuous>  remdesivir  IVPB   IV Intermittent   sodium bicarbonate 650 milliGRAM(s) Oral every 8 hours    PRN MEDICATIONS  acetaminophen     Tablet .. 650 milliGRAM(s) Oral every 6 hours PRN  aluminum hydroxide/magnesium hydroxide/simethicone Suspension 30 milliLiter(s) Oral every 4 hours PRN  guaifenesin/dextromethorphan Oral Liquid 10 milliLiter(s) Oral every 6 hours PRN  melatonin 3 milliGRAM(s) Oral at bedtime PRN  ondansetron Injectable 4 milliGRAM(s) IV Push every 8 hours PRN      VITAL SIGNS: Last 24 Hours  T(C): 36.4 (25 Sep 2022 20:00), Max: 38.3 (24 Sep 2022 22:00)  T(F): 97.6 (25 Sep 2022 20:00), Max: 100.9 (24 Sep 2022 22:00)  HR: 74 (25 Sep 2022 20:00) (62 - 99)  BP: 107/80 (25 Sep 2022 20:00) (101/69 - 126/80)  BP(mean): 88 (25 Sep 2022 20:00) (77 - 100)  RR: 27 (25 Sep 2022 20:00) (18 - 37)  SpO2: 97% (25 Sep 2022 20:00) (94% - 99%)    LABS:                        11.8   9.23  )-----------( 183      ( 25 Sep 2022 12:50 )             33.1         137  |  106  |  8<L>  ----------------------------<  295<H>  3.6   |  22  |  0.6<L>    Ca    8.2<L>      25 Sep 2022 12:50  Phos  2.7       Mg     2.6         TPro  4.8<L>  /  Alb  2.9<L>  /  TBili  0.3  /  DBili  <0.2  /  AST  8   /  ALT  7   /  AlkPhos  65      PT/INR - ( 25 Sep 2022 12:50 )   PT: 12.70 sec;   INR: 1.11 ratio                     RADIOLOGY:    ASSESSMENT & PLAN:   43 yo M with PMHX of latent autoimmune diabetes ( DM 1.5) presents to the ED for flu like symptoms after testing for COVID 5 days ago. Patient states that his symptoms started on monday where he was having fevers, shortness of breath and decreased po intake. Because he was not eating patient did not take his diabetes medications. He endorses one episode of vomiting which he had after taking paxlovid yesterday for the first time yesterday. Patient has not had any BM for 4 days now. He does endorse passing gas.       #DKA  -Anion Gap closed  -Glucose 190-240  -D/c D5, started on LR  d/c insulin drip, started on insulin Sub Q  -f.u labs    #JANET, likely prerenal  -Monitor BMPs  -Fluids    #Mild COVID on RA  -continue Paxlovid  -Monitor O2 and CXR  -f/u inflammatory markers  -ID consult placed    #Possible gastric outlet obstruction  -Gastric distention likely due to gastroparesis  -May begin clear liquid diet and assess if pt is tolerating  -F/U KUB  -If patient vomits, will place NGT for gastric decompression  -f/u GI eval as outpatient  -Insulin gtt, strict glucose control   -Endocrine reccs  -Surgery to follow     DVT prophylaxis: Heparin subq  Diet: advanced to carb  out of bed      For Follow-Up:  F.Sugar and Endo recs

## 2022-09-25 NOTE — PROGRESS NOTE ADULT - ASSESSMENT
43 yo M with PMHX of latent autoimmune diabetes ( DM 1.5) presents to the ED for flu like symptoms after testing for COVID 5 days ago. Patient states that his symptoms started on monday where he was having fevers, shortness of breath and decreased po intake. Because he was not eating patient did not take his diabetes medications. He endorses one episode of vomiting which he had after taking paxlovid yesterday for the first time yesterday. Patient has not had any BM for 4 days now. He does endorse passing gas.       #DKA  -Anion Gap closed  -Glucose 190-240  -D/c D5, started on LR  d/c insulin drip, started on insulin Sub Q  -f.u labs    #JANET, likely prerenal  -Monitor BMPs  -Fluids    #Mild COVID on RA  -continue Paxlovid  -Monitor O2 and CXR    #Possible gastric outlet obstruction  -Gastric distention likely due to gastroparesis  -May begin clear liquid diet and assess if pt is tolerating  -F/U KUB  -If patient vomits, will place NGT for gastric decompression  -f/u GI eval as outpatient  -Insulin gtt, strict glucose control   -Endocrine reccs  -Surgery to follow     DVT prophylaxis: Heparin subq  Diet: clear liquid  out of bed 43 yo M with PMHX of latent autoimmune diabetes ( DM 1.5) presents to the ED for flu like symptoms after testing for COVID 5 days ago. Patient states that his symptoms started on monday where he was having fevers, shortness of breath and decreased po intake. Because he was not eating patient did not take his diabetes medications. He endorses one episode of vomiting which he had after taking paxlovid yesterday for the first time yesterday. Patient has not had any BM for 4 days now. He does endorse passing gas.       #DKA  -Anion Gap closed  -Glucose 190-240  -D/c D5, started on LR  d/c insulin drip, started on insulin Sub Q  -f.u labs    #JANET, likely prerenal  -Monitor BMPs  -Fluids    #Mild COVID on RA  -continue Paxlovid  -Monitor O2 and CXR  -f/u inflammatory markers  -ID consult placed    #Possible gastric outlet obstruction  -Gastric distention likely due to gastroparesis  -May begin clear liquid diet and assess if pt is tolerating  -F/U KUB  -If patient vomits, will place NGT for gastric decompression  -f/u GI eval as outpatient  -Insulin gtt, strict glucose control   -Endocrine reccs  -Surgery to follow     DVT prophylaxis: Heparin subq  Diet: advanced to carb  out of bed

## 2022-09-25 NOTE — CONSULT NOTE ADULT - ASSESSMENT
43 yo M with PMHX of latent autoimmune diabetes ( DM 1.5) presents to the ED for flu like symptoms after testing for COVID 5 days ago. Patient states that his symptoms started on monday where he was having fevers, shortness of breath and decreased po intake. Because he was not eating, patient did not take his diabetes medications for three days. He endorses one episode of vomiting which he had after taking paxlovid yesterday for the first time yesterday. Patient has not had any BM for 4 days now but does endorse passing gas.     #DKA likely 2/2 not taking medication and active infection (COVID)   #Gastric outlet obstruction vs Gastroparesis  - home dm meds: jardiance 10mg qd, janumet 100-1000 bid, tresiba 8U qhs   - endocrinologist: Dr. Prince Rivera  - VBG pH 6.97, bicarb 4, AG 37, BHB > 9, K 6.5   - started on D5 1/2 NS with bicarb on 200cc/hr   - cont insulin drip until gap closes   - monitor electrolytes atc     
ASSESSMENT:  42yM w/ PMHx of latent autoimmune diabetes admitted for DKA. Patient reports testing positive for COVID 5 days go and since then has been having SOB, lethargy, decrease PO intake, and nausea. Patient was not taking his DM medications due to not eating. Last BM was 4 days ago but endorses passing flatus. Surgery consulted to r/o GOO. Physical exam findings, imaging, and labs as documented above.     PLAN:  -Gastric distention likely due to gastroparesis than gastric outlet obstruction   -NPO w/ IVF  -Recommend NGT for gastric decompression   -GI evaluation   -Insuline gtt, strict glucose control   -Surgery to follow     Above plan discussed with Attending Surgeon Dr. Best, patient, patient family, and Primary team  09-23-22 @ 16:23    Trauma SPECTRA: [id number]  
IMPRESSION:  DKA  JANET, likely prerenal  Mild COVID on RA  Possible gastric outlet obstruction      PLAN:    CNS: Avoid CNS depressants    HEENT: Oral care    PULMONARY:  HOB @ 45 degrees. Aspiration precautions. Target SpO2 92-96%, titrate oxygen as tolerated.    CARDIOVASCULAR: Monitor for signs of volume overload. 2 sets of CE.    GI: NPO for possible obstruction and for DKA protocol. Surgery pending.    RENAL: D5+NS with 20meq K at 200. BMP q4. Follow up lytes. Correct as needed.    INFECTIOUS DISEASE: Follow up cultures. No abx for now.     HEMATOLOGICAL: DVT prophylaxis: Heparin subq    ENDOCRINE: FS q1h. DKA insulin protocol. TSH. A1c. Endocrine eval.      MUSCULOSKELETAL: Increase ambulation as tolerated.     MICU monitoring for now 
  ASSESSMENT  43 yo M with PMHX of latent autoimmune diabetes ( DM 1.5) presents to the ED for flu like symptoms after testing for COVID 5 days ago.    IMPRESSION  #DKA  #COVID-19, non-severe  #JANET  #Gastroparesis    #Abx allergy: NKDA    RECOMMENDATIONS  - RDV for 5 day course  - does not have to finish course of RDV prior to discharge if clinically improving -- can finish course of Paxlovid at home   - supportive care  - monitor SPO2 -- if <94% on room air, can give dex     Please call or message on [employer] Teams if with any questions.  Spectra [phone]

## 2022-09-26 ENCOUNTER — TRANSCRIPTION ENCOUNTER (OUTPATIENT)
Age: 42
End: 2022-09-26

## 2022-09-26 LAB
ALBUMIN SERPL ELPH-MCNC: 2.7 G/DL — LOW (ref 3.5–5.2)
ALBUMIN SERPL ELPH-MCNC: 2.9 G/DL — LOW (ref 3.5–5.2)
ALP SERPL-CCNC: 63 U/L — SIGNIFICANT CHANGE UP (ref 30–115)
ALP SERPL-CCNC: 63 U/L — SIGNIFICANT CHANGE UP (ref 30–115)
ALT FLD-CCNC: 8 U/L — SIGNIFICANT CHANGE UP (ref 0–41)
ALT FLD-CCNC: 9 U/L — SIGNIFICANT CHANGE UP (ref 0–41)
ANION GAP SERPL CALC-SCNC: 9 MMOL/L — SIGNIFICANT CHANGE UP (ref 7–14)
AST SERPL-CCNC: 10 U/L — SIGNIFICANT CHANGE UP (ref 0–41)
AST SERPL-CCNC: 10 U/L — SIGNIFICANT CHANGE UP (ref 0–41)
BASOPHILS # BLD AUTO: 0.02 K/UL — SIGNIFICANT CHANGE UP (ref 0–0.2)
BASOPHILS NFR BLD AUTO: 0.3 % — SIGNIFICANT CHANGE UP (ref 0–1)
BILIRUB DIRECT SERPL-MCNC: <0.2 MG/DL — SIGNIFICANT CHANGE UP (ref 0–0.3)
BILIRUB INDIRECT FLD-MCNC: >0.1 MG/DL — LOW (ref 0.2–1.2)
BILIRUB SERPL-MCNC: 0.3 MG/DL — SIGNIFICANT CHANGE UP (ref 0.2–1.2)
BILIRUB SERPL-MCNC: 0.3 MG/DL — SIGNIFICANT CHANGE UP (ref 0.2–1.2)
BUN SERPL-MCNC: 12 MG/DL — SIGNIFICANT CHANGE UP (ref 10–20)
CALCIUM SERPL-MCNC: 8.1 MG/DL — LOW (ref 8.4–10.5)
CHLORIDE SERPL-SCNC: 109 MMOL/L — SIGNIFICANT CHANGE UP (ref 98–110)
CO2 SERPL-SCNC: 26 MMOL/L — SIGNIFICANT CHANGE UP (ref 17–32)
CREAT SERPL-MCNC: 0.6 MG/DL — LOW (ref 0.7–1.5)
CRP SERPL-MCNC: 90.1 MG/L — HIGH
EGFR: 124 ML/MIN/1.73M2 — SIGNIFICANT CHANGE UP
EOSINOPHIL # BLD AUTO: 0.02 K/UL — SIGNIFICANT CHANGE UP (ref 0–0.7)
EOSINOPHIL NFR BLD AUTO: 0.3 % — SIGNIFICANT CHANGE UP (ref 0–8)
FERRITIN SERPL-MCNC: 751 NG/ML — HIGH (ref 30–400)
GLUCOSE BLDC GLUCOMTR-MCNC: 100 MG/DL — HIGH (ref 70–99)
GLUCOSE BLDC GLUCOMTR-MCNC: 120 MG/DL — HIGH (ref 70–99)
GLUCOSE BLDC GLUCOMTR-MCNC: 163 MG/DL — HIGH (ref 70–99)
GLUCOSE BLDC GLUCOMTR-MCNC: 167 MG/DL — HIGH (ref 70–99)
GLUCOSE BLDC GLUCOMTR-MCNC: 184 MG/DL — HIGH (ref 70–99)
GLUCOSE BLDC GLUCOMTR-MCNC: 276 MG/DL — HIGH (ref 70–99)
GLUCOSE BLDC GLUCOMTR-MCNC: 81 MG/DL — SIGNIFICANT CHANGE UP (ref 70–99)
GLUCOSE BLDC GLUCOMTR-MCNC: 98 MG/DL — SIGNIFICANT CHANGE UP (ref 70–99)
GLUCOSE SERPL-MCNC: 73 MG/DL — SIGNIFICANT CHANGE UP (ref 70–99)
HCT VFR BLD CALC: 29.6 % — LOW (ref 42–52)
HGB BLD-MCNC: 10.2 G/DL — LOW (ref 14–18)
IMM GRANULOCYTES NFR BLD AUTO: 0.6 % — HIGH (ref 0.1–0.3)
INR BLD: 1.14 RATIO — SIGNIFICANT CHANGE UP (ref 0.65–1.3)
LYMPHOCYTES # BLD AUTO: 1.57 K/UL — SIGNIFICANT CHANGE UP (ref 1.2–3.4)
LYMPHOCYTES # BLD AUTO: 22.8 % — SIGNIFICANT CHANGE UP (ref 20.5–51.1)
MAGNESIUM SERPL-MCNC: 1.8 MG/DL — SIGNIFICANT CHANGE UP (ref 1.8–2.4)
MCHC RBC-ENTMCNC: 28.8 PG — SIGNIFICANT CHANGE UP (ref 27–31)
MCHC RBC-ENTMCNC: 34.5 G/DL — SIGNIFICANT CHANGE UP (ref 32–37)
MCV RBC AUTO: 83.6 FL — SIGNIFICANT CHANGE UP (ref 80–94)
MONOCYTES # BLD AUTO: 0.75 K/UL — HIGH (ref 0.1–0.6)
MONOCYTES NFR BLD AUTO: 10.9 % — HIGH (ref 1.7–9.3)
NEUTROPHILS # BLD AUTO: 4.48 K/UL — SIGNIFICANT CHANGE UP (ref 1.4–6.5)
NEUTROPHILS NFR BLD AUTO: 65.1 % — SIGNIFICANT CHANGE UP (ref 42.2–75.2)
NRBC # BLD: 0 /100 WBCS — SIGNIFICANT CHANGE UP (ref 0–0)
PLATELET # BLD AUTO: 217 K/UL — SIGNIFICANT CHANGE UP (ref 130–400)
POTASSIUM SERPL-MCNC: 3.4 MMOL/L — LOW (ref 3.5–5)
POTASSIUM SERPL-SCNC: 3.4 MMOL/L — LOW (ref 3.5–5)
PROCALCITONIN SERPL-MCNC: 0.14 NG/ML — HIGH (ref 0.02–0.1)
PROT SERPL-MCNC: 4.6 G/DL — LOW (ref 6–8)
PROT SERPL-MCNC: 4.7 G/DL — LOW (ref 6–8)
PROTHROM AB SERPL-ACNC: 13.1 SEC — HIGH (ref 9.95–12.87)
RBC # BLD: 3.54 M/UL — LOW (ref 4.7–6.1)
RBC # FLD: 12.5 % — SIGNIFICANT CHANGE UP (ref 11.5–14.5)
SODIUM SERPL-SCNC: 144 MMOL/L — SIGNIFICANT CHANGE UP (ref 135–146)
WBC # BLD: 6.88 K/UL — SIGNIFICANT CHANGE UP (ref 4.8–10.8)
WBC # FLD AUTO: 6.88 K/UL — SIGNIFICANT CHANGE UP (ref 4.8–10.8)

## 2022-09-26 PROCEDURE — 99232 SBSQ HOSP IP/OBS MODERATE 35: CPT

## 2022-09-26 RX ORDER — POTASSIUM CHLORIDE 20 MEQ
20 PACKET (EA) ORAL
Refills: 0 | Status: DISCONTINUED | OUTPATIENT
Start: 2022-09-26 | End: 2022-09-26

## 2022-09-26 RX ORDER — POTASSIUM CHLORIDE 20 MEQ
20 PACKET (EA) ORAL ONCE
Refills: 0 | Status: COMPLETED | OUTPATIENT
Start: 2022-09-26 | End: 2022-09-26

## 2022-09-26 RX ADMIN — Medication 4 UNIT(S): at 12:53

## 2022-09-26 RX ADMIN — Medication 10 MILLILITER(S): at 14:33

## 2022-09-26 RX ADMIN — Medication 100 MILLIGRAM(S): at 13:15

## 2022-09-26 RX ADMIN — INSULIN GLARGINE 12 UNIT(S): 100 INJECTION, SOLUTION SUBCUTANEOUS at 21:22

## 2022-09-26 RX ADMIN — HEPARIN SODIUM 5000 UNIT(S): 5000 INJECTION INTRAVENOUS; SUBCUTANEOUS at 19:46

## 2022-09-26 RX ADMIN — Medication 650 MILLIGRAM(S): at 21:15

## 2022-09-26 RX ADMIN — Medication 100 MILLIGRAM(S): at 05:57

## 2022-09-26 RX ADMIN — Medication 650 MILLIGRAM(S): at 14:50

## 2022-09-26 RX ADMIN — Medication 50 MILLIEQUIVALENT(S): at 14:33

## 2022-09-26 RX ADMIN — HEPARIN SODIUM 5000 UNIT(S): 5000 INJECTION INTRAVENOUS; SUBCUTANEOUS at 05:58

## 2022-09-26 RX ADMIN — PANTOPRAZOLE SODIUM 40 MILLIGRAM(S): 20 TABLET, DELAYED RELEASE ORAL at 06:01

## 2022-09-26 RX ADMIN — Medication 2: at 12:53

## 2022-09-26 RX ADMIN — REMDESIVIR 500 MILLIGRAM(S): 5 INJECTION INTRAVENOUS at 16:27

## 2022-09-26 RX ADMIN — Medication 4 UNIT(S): at 17:57

## 2022-09-26 RX ADMIN — Medication 650 MILLIGRAM(S): at 13:15

## 2022-09-26 RX ADMIN — Medication 650 MILLIGRAM(S): at 21:25

## 2022-09-26 RX ADMIN — Medication 20 MILLIEQUIVALENT(S): at 21:15

## 2022-09-26 RX ADMIN — Medication 650 MILLIGRAM(S): at 05:58

## 2022-09-26 RX ADMIN — CHLORHEXIDINE GLUCONATE 1 APPLICATION(S): 213 SOLUTION TOPICAL at 05:16

## 2022-09-26 RX ADMIN — Medication 100 MILLIGRAM(S): at 21:15

## 2022-09-26 RX ADMIN — Medication 650 MILLIGRAM(S): at 15:20

## 2022-09-26 RX ADMIN — ATORVASTATIN CALCIUM 10 MILLIGRAM(S): 80 TABLET, FILM COATED ORAL at 12:54

## 2022-09-26 RX ADMIN — Medication 2: at 17:57

## 2022-09-26 RX ADMIN — SODIUM CHLORIDE 75 MILLILITER(S): 9 INJECTION, SOLUTION INTRAVENOUS at 04:21

## 2022-09-26 NOTE — PROGRESS NOTE ADULT - ASSESSMENT
IMPRESSION:    Severe DKA improved  JANET resolved  COVID on RA 94%  Gastroparesis less likely gastric outlet obstruction      PLAN:    CNS: Avoid CNS depressants    HEENT: Oral care    PULMONARY:  HOB @ 45 degrees. Aspiration precautions. Target SpO2 92-96%, POX on RA, repeat CXR    CARDIO: DC IVF     GI: CC diet    RENAL: trend CMP    INFECTIOUS DISEASE: Follow up cultures. No abx for now. ID eval appreciated, RDV.    HEMATOLOGICAL: DVT prophylaxis: Heparin subq.    ENDOCRINE: Fup FS, Endo eval appreciated.    MUSCULOSKELETAL: Increase ambulation as tolerated.     AAT    DGTF IMPRESSION:    Severe DKA improved  JANET resolved  COVID 19 infection   Gastroparesis less likely gastric outlet obstruction      PLAN:    CNS: Avoid CNS depressants    HEENT: Oral care    PULMONARY:  HOB @ 45 degrees. Aspiration precautions. Target SpO2 92-96%, POX on RA, Repeat CXR    CARDIO: DC IVF.       GI: Feeding     RENAL: trend CMP    INFECTIOUS DISEASE: Follow up cultures. No abx for now. ID eval appreciated, RDV.    HEMATOLOGICAL: DVT prophylaxis: Heparin subq.    ENDOCRINE: Fup FS, Endo eval appreciated.    MUSCULOSKELETAL: Increase ambulation as tolerated.     AAT    DGTF

## 2022-09-26 NOTE — PROGRESS NOTE ADULT - ATTENDING COMMENTS
IMPRESSION:    Severe DKA improved  JANET resolved  COVID 19 infection   Gastroparesis     Plan as outlined above

## 2022-09-26 NOTE — PROGRESS NOTE ADULT - ASSESSMENT
43 yo M with PMHX of latent autoimmune diabetes ( DM 1.5) presents to the ED for flu like symptoms after testing for COVID 5 days ago. Patient states that his symptoms started on monday where he was having fevers, shortness of breath and decreased po intake. Because he was not eating patient did not take his diabetes medications. He endorses one episode of vomiting which he had after taking paxlovid yesterday for the first time yesterday. Patient has not had any BM for 4 days now. He does endorse passing gas.       #DKA  -Anion Gap closed  -D/c D5, d/c LR   d/c insulin drip, started on insulin Sub Q  -f.u labs    #JANET, likely prerenal  -Monitor BMPs  -Fluids    #Mild COVID on RA  -Remdesevir day 2/5  -Monitor O2 and CXR  -f/u inflammatory markers  -ID consult reccs    #Possible gastric outlet obstruction  -Gastric distention likely due to gastroparesis  -May begin clear liquid diet and assess if pt is tolerating  -F/U KUB  -If patient vomits, will place NGT for gastric decompression  -f/u GI eval as outpatient  -Insulin gtt, strict glucose control   -Endocrine reccs  -Surgery to follow     DVT prophylaxis: Heparin subq  Diet: advanced to carb  out of bed

## 2022-09-26 NOTE — PROGRESS NOTE ADULT - SUBJECTIVE AND OBJECTIVE BOX
GENERAL SURGERY PROGRESS NOTE    Patient: IRMA ENNIS , 42y (80)Male   MRN: [id number]  Location: [facility] ER Hold 024 A  Visit: 22 Inpatient  Date: 22 @ 13:33    Procedure/Dx/Injuries: r/o gastric outlet obstruction; DKA     Events of past 24 hours: Surgery consulted to rule out gastric outlet obstruction. Pt came in for fevers, SOB and decreased PO intake, no BM for four days one episode of vomiting after taking paxlovid for COVID. Pt seen and examined at bedside on rounds. Patient reports one bowel movement this morning, and flatus. Patient denies nausea and/or vomiting. Pt being followed by endocrine for DKA.     PAST MEDICAL & SURGICAL HISTORY:  Diabetes    Vitals:   T(F): 98.8 (22 @ 05:00), Max: 99.3 (22 @ 18:16)  HR: 99 (22 @ 06:00)  BP: 99/63 (22 @ 06:00)  RR: 15 (22 @ 06:00)  SpO2: 98% (22 @ 06:00)    Diet, Clear Liquid    Fluids: lactated ringers.: Solution, 1000 milliLiter(s) infuse at 100 mL/Hr    I & O's:  22 @ 07:01  -  22 @ 07:00  --------------------------------------------------------  IN:    dextrose 5% + sodium chloride 0.9%: 1200 mL    Sodium Chloride 0.9% Bolus: 1000 mL  Total IN: 2200 mL    OUT:  Total OUT: 0 mL  Total NET: 2200 mL      PHYSICAL EXAM:  General: NAD, AAOx3, calm and cooperative  HEENT: NCAT, EOMI  Cardiac: S1, S2  Respiratory: normal respiratory effort, bilateral breath sounds   Abdomen: Soft, non-distended, non-tender, no rebound, no guarding  Skin: Warm/dry, normal color, no jaundice      MEDICATIONS  (STANDING):  atorvastatin Oral Tab/Cap - Peds 10 milliGRAM(s) Oral daily  chlorhexidine 2% Cloths 1 Application(s) Topical <User Schedule>  dextrose 5%. 1000 milliLiter(s) (50 mL/Hr) IV Continuous <Continuous>  dextrose 5%. 1000 milliLiter(s) (100 mL/Hr) IV Continuous <Continuous>  dextrose 50% Injectable 25 Gram(s) IV Push once  dextrose 50% Injectable 12.5 Gram(s) IV Push once  dextrose 50% Injectable 25 Gram(s) IV Push once  glucagon  Injectable 1 milliGRAM(s) IntraMuscular once  heparin   Injectable 5000 Unit(s) SubCutaneous every 12 hours  insulin glargine Injectable (LANTUS) 12 Unit(s) SubCutaneous at bedtime  insulin lispro (ADMELOG) corrective regimen sliding scale   SubCutaneous three times a day before meals  insulin lispro Injectable (ADMELOG) 4 Unit(s) SubCutaneous three times a day before meals  lactated ringers. 1000 milliLiter(s) (100 mL/Hr) IV Continuous <Continuous>  pantoprazole  Injectable 40 milliGRAM(s) IV Push daily    MEDICATIONS  (PRN):  acetaminophen     Tablet .. 650 milliGRAM(s) Oral every 6 hours PRN Temp greater or equal to 38C (100.4F), Mild Pain (1 - 3)  aluminum hydroxide/magnesium hydroxide/simethicone Suspension 30 milliLiter(s) Oral every 4 hours PRN Dyspepsia  dextrose Oral Gel 15 Gram(s) Oral once PRN Blood Glucose LESS THAN 70 milliGRAM(s)/deciliter  melatonin 3 milliGRAM(s) Oral at bedtime PRN Insomnia  ondansetron Injectable 4 milliGRAM(s) IV Push every 8 hours PRN Nausea and/or Vomiting      DVT PROPHYLAXIS: heparin   Injectable 5000 Unit(s) SubCutaneous every 12 hours    GI PROPHYLAXIS: pantoprazole  Injectable 40 milliGRAM(s) IV Push daily      Isolation Precautions:     Isolation Type: [place]+CONTACT;  Indication for Isolation: COVID (22 @ 14:22)      LAB/STUDIES:  Labs:  CAPILLARY BLOOD GLUCOSE  POCT Blood Glucose.: 193 mg/dL (24 Sep 2022 11:23)  POCT Blood Glucose.: 224 mg/dL (24 Sep 2022 07:09)  POCT Blood Glucose.: 192 mg/dL (24 Sep 2022 05:44)  POCT Blood Glucose.: 134 mg/dL (24 Sep 2022 01:29)  POCT Blood Glucose.: 139 mg/dL (24 Sep 2022 00:25)  POCT Blood Glucose.: 136 mg/dL (23 Sep 2022 23:17)  POCT Blood Glucose.: 121 mg/dL (23 Sep 2022 22:19)  POCT Blood Glucose.: 136 mg/dL (23 Sep 2022 20:38)  POCT Blood Glucose.: 140 mg/dL (23 Sep 2022 19:19)  POCT Blood Glucose.: 129 mg/dL (23 Sep 2022 18:12)  POCT Blood Glucose.: 132 mg/dL (23 Sep 2022 17:05)  POCT Blood Glucose.: 161 mg/dL (23 Sep 2022 16:06)  POCT Blood Glucose.: 132 mg/dL (23 Sep 2022 15:06)  POCT Blood Glucose.: 157 mg/dL (23 Sep 2022 14:13)                        12.7   10. )-----------( 244      ( 24 Sep 2022 06:49 )             37.0       Auto Neutrophil %: 83.3 % (22 @ 06:49)  Auto Immature Granulocyte %: 0.7 % (22 @ 06:49)        141  |  109  |  14  ----------------------------<  239<H>  4.3   |  15<L>  |  0.9    Calcium, Total Serum: 9.5 mg/dL (22 @ 06:49)    LFTs:             5.5  | 0.3  | 8        ------------------[75      ( 24 Sep 2022 06:49 )  3.3  | x    | 8           Lipase:x      Amylase:x         Blood Gas Arterial, Lactate: 0.70 mmol/L (22 @ 13:58)  Blood Gas Venous - Lactate: 2.40 mmol/L (22 @ 09:40)    ABG - ( 23 Sep 2022 13:58 )  pH: 7.14  /  pCO2: 14    /  pO2: 113   / HCO3: 5     / Base Excess: -21.8 /  SaO2: 97.4      CARDIAC MARKERS ( 23 Sep 2022 16:35 )  x     / <0.01 ng/mL / x     / x     / x        Urinalysis Basic - ( 23 Sep 2022 11:27 )    Color: Light Yellow / Appearance: Clear / S.029 / pH: x  Gluc: x / Ketone: Large  / Bili: Negative / Urobili: <2 mg/dL   Blood: x / Protein: 30 mg/dL / Nitrite: Negative   Leuk Esterase: Negative / RBC: 6 /HPF / WBC 1 /HPF   Sq Epi: x / Non Sq Epi: 2 /HPF / Bacteria: Negative        IMAGING:  < from: Xray Chest 1 View- PORTABLE-Urgent (22 @ 09:44) >  Impression:  Left basilar opacity, may be of infectious etiology in the appropriate   clinical setting. Recommend follow-up to resolution.      < from: CT Abdomen and Pelvis w/ IV Cont (22 @ 10:50) >  IMPRESSION:  No CT evidence of pyelonephritis.    Severely distended fluid-filled stomach with transition at the duodenum.    This could represent gastric outlet obstruction versus gastroparesis.    Bilateral lower lung zone opacities as detailed above. Correlate for   infectious/inflammatory etiologies.    
Over Night Events: events noted, feels better, co cough, on NC 2 l, low grade fever, on 75 cc LR    PHYSICAL EXAM    ICU Vital Signs Last 24 Hrs  T(C): 37.5 (25 Sep 2022 04:00), Max: 38.3 (24 Sep 2022 22:00)  T(F): 99.5 (25 Sep 2022 04:00), Max: 100.9 (24 Sep 2022 22:00)  HR: 82 (25 Sep 2022 07:00) (78 - 99)  BP: 116/82 (25 Sep 2022 07:00) (95/52 - 117/74)  BP(mean): 100 (25 Sep 2022 07:) (77 - 100)  RR: 31 (25 Sep 2022 07:) (17 - 35)  SpO2: 97% (25 Sep 2022 07:) (94% - 99%)    O2 Parameters below as of 25 Sep 2022 04:00  Patient On (Oxygen Delivery Method): room air            General: ill looking  Lungs: dec bs both bases  Cardiovascular: Regular   Abdomen: Soft, Positive BS  Extremities: No clubbing   Skin: Warm  Neurological: Non focal       22 @ 07:01  -  22 @ 07:00  --------------------------------------------------------  IN:    IV PiggyBack: 600 mL    Lactated Ringers: 600 mL  Total IN: 1200 mL    OUT:    Voided (mL): 1300 mL  Total OUT: 1300 mL    Total NET: -100 mL          LABS:                          12.7   10.24 )-----------( 244      ( 24 Sep 2022 06:49 )             37.0                                               09-25    138  |  106  |  7<L>  ----------------------------<  176<H>  3.9   |  21  |  0.7    Ca    8.1<L>      25 Sep 2022 04:55  Phos  2.7     09-25  Mg     2.6     -25    TPro  5.5<L>  /  Alb  3.3<L>  /  TBili  0.3  /  DBili  x   /  AST  8   /  ALT  8   /  AlkPhos  75  09-24                                             Urinalysis Basic - ( 23 Sep 2022 11:27 )    Color: Light Yellow / Appearance: Clear / S.029 / pH: x  Gluc: x / Ketone: Large  / Bili: Negative / Urobili: <2 mg/dL   Blood: x / Protein: 30 mg/dL / Nitrite: Negative   Leuk Esterase: Negative / RBC: 6 /HPF / WBC 1 /HPF   Sq Epi: x / Non Sq Epi: 2 /HPF / Bacteria: Negative        CARDIAC MARKERS ( 23 Sep 2022 16:35 )  x     / <0.01 ng/mL / x     / x     / x                                                LIVER FUNCTIONS - ( 24 Sep 2022 06:49 )  Alb: 3.3 g/dL / Pro: 5.5 g/dL / ALK PHOS: 75 U/L / ALT: 8 U/L / AST: 8 U/L / GGT: x                                                                                                                                   ABG - ( 23 Sep 2022 13:58 )  pH, Arterial: 7.14  pH, Blood: x     /  pCO2: 14    /  pO2: 113   / HCO3: 5     / Base Excess: -21.8 /  SaO2: 97.4                MEDICATIONS  (STANDING):  atorvastatin Oral Tab/Cap - Peds 10 milliGRAM(s) Oral daily  benzonatate 100 milliGRAM(s) Oral three times a day  chlorhexidine 2% Cloths 1 Application(s) Topical <User Schedule>  glucagon  Injectable 1 milliGRAM(s) IntraMuscular once  heparin   Injectable 5000 Unit(s) SubCutaneous every 12 hours  influenza   Vaccine 0.5 milliLiter(s) IntraMuscular once  insulin glargine Injectable (LANTUS) 12 Unit(s) SubCutaneous at bedtime  insulin lispro Injectable (ADMELOG) 4 Unit(s) SubCutaneous three times a day before meals  lactated ringers. 1000 milliLiter(s) (75 mL/Hr) IV Continuous <Continuous>  pantoprazole  Injectable 40 milliGRAM(s) IV Push daily  sodium bicarbonate 650 milliGRAM(s) Oral every 8 hours    MEDICATIONS  (PRN):  acetaminophen     Tablet .. 650 milliGRAM(s) Oral every 6 hours PRN Temp greater or equal to 38C (100.4F), Mild Pain (1 - 3)  aluminum hydroxide/magnesium hydroxide/simethicone Suspension 30 milliLiter(s) Oral every 4 hours PRN Dyspepsia  guaifenesin/dextromethorphan Oral Liquid 10 milliLiter(s) Oral every 6 hours PRN Cough  melatonin 3 milliGRAM(s) Oral at bedtime PRN Insomnia  ondansetron Injectable 4 milliGRAM(s) IV Push every 8 hours PRN Nausea and/or Vomiting      CXR reviewed  
GENERAL SURGERY PROGRESS NOTE    Patient: IRMA ENNIS , 42y (80)Male   MRN: [id number]  Location: [facility]  A  Visit: 22 Inpatient  Date: 22 @ 03:44    Hospital Day #: 3  Post-Op Day #:    Procedure/Dx/Injuries: R/O GOO    Events of past 24 hours: Advanced to clears after contrast was visualized in colon. Still on insulin drip.    PAST MEDICAL & SURGICAL HISTORY:  Diabetes      Vitals:   T(F): 100.4 (22 @ 23:00), Max: 100.9 (22 @ 22:00)  HR: 99 (22 @ 23:00)  BP: 113/69 (22 @ 23:00)  RR: 18 (22 @ 23:00)  SpO2: 99% (22 @ 23:00)      Diet, NPO:   With Ice Chips/Sips of Water    Fluids: lactated ringers.: Solution, 1000 milliLiter(s) infuse at 75 mL/Hr      I & O's:    22 @ 07:01  -  22 @ 07:00  --------------------------------------------------------  IN:    dextrose 5% + sodium chloride 0.9%: 1200 mL    Sodium Chloride 0.9% Bolus: 1000 mL  Total IN: 2200 mL    OUT:  Total OUT: 0 mL    Total NET: 2200 mL      PHYSICAL EXAM:  General: NAD, AAOx3, calm and cooperative  HEENT: NCAT, EOMI  Cardiac: S1, S2  Respiratory: normal respiratory effort, bilateral breath sounds   Abdomen: Soft, non-distended, non-tender, no rebound, no guarding  Skin: Warm/dry, normal color, no jaundice    MEDICATIONS  (STANDING):  atorvastatin Oral Tab/Cap - Peds 10 milliGRAM(s) Oral daily  benzonatate 100 milliGRAM(s) Oral three times a day  chlorhexidine 2% Cloths 1 Application(s) Topical <User Schedule>  glucagon  Injectable 1 milliGRAM(s) IntraMuscular once  heparin   Injectable 5000 Unit(s) SubCutaneous every 12 hours  insulin glargine Injectable (LANTUS) 12 Unit(s) SubCutaneous at bedtime  insulin lispro Injectable (ADMELOG) 4 Unit(s) SubCutaneous three times a day before meals  lactated ringers. 1000 milliLiter(s) (75 mL/Hr) IV Continuous <Continuous>  pantoprazole  Injectable 40 milliGRAM(s) IV Push daily  sodium bicarbonate 650 milliGRAM(s) Oral every 8 hours    MEDICATIONS  (PRN):  acetaminophen     Tablet .. 650 milliGRAM(s) Oral every 6 hours PRN Temp greater or equal to 38C (100.4F), Mild Pain (1 - 3)  aluminum hydroxide/magnesium hydroxide/simethicone Suspension 30 milliLiter(s) Oral every 4 hours PRN Dyspepsia  guaifenesin/dextromethorphan Oral Liquid 10 milliLiter(s) Oral every 6 hours PRN Cough  melatonin 3 milliGRAM(s) Oral at bedtime PRN Insomnia  ondansetron Injectable 4 milliGRAM(s) IV Push every 8 hours PRN Nausea and/or Vomiting      DVT PROPHYLAXIS: heparin   Injectable 5000 Unit(s) SubCutaneous every 12 hours    GI PROPHYLAXIS: pantoprazole  Injectable 40 milliGRAM(s) IV Push daily    ANTICOAGULATION:   ANTIBIOTICS:            LAB/STUDIES:  Labs:  CAPILLARY BLOOD GLUCOSE      POCT Blood Glucose.: 161 mg/dL (25 Sep 2022 01:58)  POCT Blood Glucose.: 196 mg/dL (25 Sep 2022 00:48)  POCT Blood Glucose.: 240 mg/dL (24 Sep 2022 23:24)  POCT Blood Glucose.: 312 mg/dL (24 Sep 2022 21:52)  POCT Blood Glucose.: 132 mg/dL (24 Sep 2022 20:09)  POCT Blood Glucose.: 144 mg/dL (24 Sep 2022 19:15)  POCT Blood Glucose.: 131 mg/dL (24 Sep 2022 16:55)  POCT Blood Glucose.: 123 mg/dL (24 Sep 2022 15:14)  POCT Blood Glucose.: 193 mg/dL (24 Sep 2022 11:23)  POCT Blood Glucose.: 224 mg/dL (24 Sep 2022 07:09)  POCT Blood Glucose.: 192 mg/dL (24 Sep 2022 05:44)                          12.7   10.24 )-----------( 244      ( 24 Sep 2022 06:49 )             37.0       Auto Neutrophil %: 83.3 % (22 @ 06:49)  Auto Immature Granulocyte %: 0.7 % (22 @ 06:49)        138  |  105  |  8<L>  ----------------------------<  186<H>  3.8   |  19  |  0.7      Calcium, Total Serum: 8.5 mg/dL (22 @ 02:10)      LFTs:             5.5  | 0.3  | 8        ------------------[75      ( 24 Sep 2022 06:49 )  3.3  | x    | 8           Lipase:x      Amylase:x         Blood Gas Arterial, Lactate: 0.70 mmol/L (22 @ 13:58)  Blood Gas Venous - Lactate: 2.40 mmol/L (22 @ 09:40)    ABG - ( 23 Sep 2022 13:58 )  pH: 7.14  /  pCO2: 14    /  pO2: 113   / HCO3: 5     / Base Excess: -21.8 /  SaO2: 97.4              Coags:    CARDIAC MARKERS ( 23 Sep 2022 16:35 )  x     / <0.01 ng/mL / x     / x     / x              Urinalysis Basic - ( 23 Sep 2022 11:27 )    Color: Light Yellow / Appearance: Clear / S.029 / pH: x  Gluc: x / Ketone: Large  / Bili: Negative / Urobili: <2 mg/dL   Blood: x / Protein: 30 mg/dL / Nitrite: Negative   Leuk Esterase: Negative / RBC: 6 /HPF / WBC 1 /HPF   Sq Epi: x / Non Sq Epi: 2 /HPF / Bacteria: Negative      IMAGING:  < from: CT Abdomen and Pelvis w/ IV Cont (22 @ 10:50) >  IMPRESSION:    No CT evidence of pyelonephritis.    Severely distended fluid-filled stomach with transition at the duodenum.    This could represent gastric outlet obstruction versus gastroparesis.    Bilateral lower lung zone opacities as detailed above. Correlate for   infectious/inflammatory etiologies.    < end of copied text >      ACCESS/ DEVICES:  [x ] Peripheral IV      
S:patient seen and examined this pm , feels better tolerating diet, only complain is cough   O: Vital Signs Last 24 Hrs  T(C): 36.4 (25 Sep 2022 16:00), Max: 38.3 (24 Sep 2022 22:00)  T(F): 97.5 (25 Sep 2022 16:00), Max: 100.9 (24 Sep 2022 22:00)  HR: 72 (25 Sep 2022 16:00) (72 - 99)  BP: 116/70 (25 Sep 2022 16:00) (101/69 - 126/80)  BP(mean): 84 (25 Sep 2022 16:00) (77 - 100)  RR: 27 (25 Sep 2022 16:00) (18 - 37)  SpO2: 95% (25 Sep 2022 16:00) (94% - 99%)    Parameters below as of 25 Sep 2022 16:00  Patient On (Oxygen Delivery Method): nasal cannula  O2 Flow (L/min): 2        PHYSICAL EXAM:    GENERAL: Comfortable, no acute distress     HEENT: Moist mucous membranes  CHEST/LUNG: no o2 use, breathing comfortably + cough    HEART: Regular rate and rhythm, no murmur   ABDOMEN: Soft, Nontender, Nondistended, Bowel sounds present  LE:  no edema      CAPILLARY BLOOD GLUCOSE      POCT Blood Glucose.: 224 mg/dL (25 Sep 2022 11:40)  POCT Blood Glucose.: 180 mg/dL (25 Sep 2022 06:46)  POCT Blood Glucose.: 149 mg/dL (25 Sep 2022 04:33)  POCT Blood Glucose.: 161 mg/dL (25 Sep 2022 01:58)  POCT Blood Glucose.: 196 mg/dL (25 Sep 2022 00:48)  POCT Blood Glucose.: 240 mg/dL (24 Sep 2022 23:24)  POCT Blood Glucose.: 312 mg/dL (24 Sep 2022 21:52)  POCT Blood Glucose.: 132 mg/dL (24 Sep 2022 20:09)  POCT Blood Glucose.: 144 mg/dL (24 Sep 2022 19:15)  POCT Blood Glucose.: 131 mg/dL (24 Sep 2022 16:55)        MEDICATIONS  (STANDING):  atorvastatin Oral Tab/Cap - Peds 10 milliGRAM(s) Oral daily  benzonatate 100 milliGRAM(s) Oral three times a day  chlorhexidine 2% Cloths 1 Application(s) Topical <User Schedule>  glucagon  Injectable 1 milliGRAM(s) IntraMuscular once  heparin   Injectable 5000 Unit(s) SubCutaneous every 12 hours  ibuprofen  Tablet. 600 milliGRAM(s) Oral once  influenza   Vaccine 0.5 milliLiter(s) IntraMuscular once  insulin glargine Injectable (LANTUS) 12 Unit(s) SubCutaneous at bedtime  insulin lispro (ADMELOG) corrective regimen sliding scale   SubCutaneous three times a day before meals  insulin lispro Injectable (ADMELOG) 4 Unit(s) SubCutaneous three times a day before meals  lactated ringers. 1000 milliLiter(s) (75 mL/Hr) IV Continuous <Continuous>  remdesivir  IVPB   IV Intermittent   sodium bicarbonate 650 milliGRAM(s) Oral every 8 hours    MEDICATIONS  (PRN):  acetaminophen     Tablet .. 650 milliGRAM(s) Oral every 6 hours PRN Temp greater or equal to 38C (100.4F), Mild Pain (1 - 3)  aluminum hydroxide/magnesium hydroxide/simethicone Suspension 30 milliLiter(s) Oral every 4 hours PRN Dyspepsia  guaifenesin/dextromethorphan Oral Liquid 10 milliLiter(s) Oral every 6 hours PRN Cough  melatonin 3 milliGRAM(s) Oral at bedtime PRN Insomnia  ondansetron Injectable 4 milliGRAM(s) IV Push every 8 hours PRN Nausea and/or Vomiting            
Over Night Events: events noted, feels better, still very thirsty, denies abd pain, passing gas, off insulin drip?, GI/ Endo reviewed    PHYSICAL EXAM    ICU Vital Signs Last 24 Hrs  T(C): 36.8 (24 Sep 2022 00:00), Max: 37.4 (23 Sep 2022 18:16)  T(F): 98.3 (24 Sep 2022 00:00), Max: 99.3 (23 Sep 2022 18:16)  HR: 96 (24 Sep 2022 05:00) (87 - 112)  BP: 104/61 (24 Sep 2022 05:00) (87/50 - 126/79)  BP(mean): 76 (24 Sep 2022 05:00) (63 - 96)  RR: 17 (24 Sep 2022 05:00) (16 - 18)  SpO2: 99% (24 Sep 2022 05:00) (96% - 100%)    O2 Parameters below as of 23 Sep 2022 21:00  Patient On (Oxygen Delivery Method): room air            General: comfortable, ketotic breath  HEENT: DONTAE             Lymph Nodes: No cervical LN   Lungs: Bilateral BS  Cardiovascular: Regular   Abdomen: Soft, Positive BS  Extremities: No clubbing   Skin: Warm  Neurological: Non focal       22 @ 07:01  -  22 @ 06:56  --------------------------------------------------------  IN:    Sodium Chloride 0.9% Bolus: 1000 mL  Total IN: 1000 mL    OUT:  Total OUT: 0 mL    Total NET: 1000 mL          LABS:                          16.4   18.83 )-----------( 363      ( 23 Sep 2022 09:40 )             51.3                                               09-24    137  |  110  |  14  ----------------------------<  138<H>  4.1   |  15<L>  |  0.8    Ca    9.6      24 Sep 2022 00:02  Phos  6.6     09-23  Mg     2.4     09-    TPro  7.9  /  Alb  4.7  /  TBili  <0.2  /  DBili  <0.2  /  AST  12  /  ALT  14  /  AlkPhos  115  -                                             Urinalysis Basic - ( 23 Sep 2022 11:27 )    Color: Light Yellow / Appearance: Clear / S.029 / pH: x  Gluc: x / Ketone: Large  / Bili: Negative / Urobili: <2 mg/dL   Blood: x / Protein: 30 mg/dL / Nitrite: Negative   Leuk Esterase: Negative / RBC: 6 /HPF / WBC 1 /HPF   Sq Epi: x / Non Sq Epi: 2 /HPF / Bacteria: Negative        CARDIAC MARKERS ( 23 Sep 2022 16:35 )  x     / <0.01 ng/mL / x     / x     / x                                                LIVER FUNCTIONS - ( 23 Sep 2022 09:40 )  Alb: 4.7 g/dL / Pro: 7.9 g/dL / ALK PHOS: 115 U/L / ALT: 14 U/L / AST: 12 U/L / GGT: x                                                                                                                                   ABG - ( 23 Sep 2022 13:58 )  pH, Arterial: 7.14  pH, Blood: x     /  pCO2: 14    /  pO2: 113   / HCO3: 5     / Base Excess: -21.8 /  SaO2: 97.4                MEDICATIONS  (STANDING):  atorvastatin Oral Tab/Cap - Peds 10 milliGRAM(s) Oral daily  chlorhexidine 2% Cloths 1 Application(s) Topical <User Schedule>  dextrose 5%. 1000 milliLiter(s) (50 mL/Hr) IV Continuous <Continuous>  dextrose 5%. 1000 milliLiter(s) (100 mL/Hr) IV Continuous <Continuous>  dextrose 50% Injectable 25 Gram(s) IV Push once  dextrose 50% Injectable 12.5 Gram(s) IV Push once  dextrose 50% Injectable 25 Gram(s) IV Push once  glucagon  Injectable 1 milliGRAM(s) IntraMuscular once  heparin   Injectable 5000 Unit(s) SubCutaneous every 12 hours  insulin glargine Injectable (LANTUS) 12 Unit(s) SubCutaneous at bedtime  insulin lispro (ADMELOG) corrective regimen sliding scale   SubCutaneous three times a day before meals  insulin lispro Injectable (ADMELOG) 4 Unit(s) SubCutaneous three times a day before meals  lactated ringers. 1000 milliLiter(s) (100 mL/Hr) IV Continuous <Continuous>  pantoprazole  Injectable 40 milliGRAM(s) IV Push daily    MEDICATIONS  (PRN):  acetaminophen     Tablet .. 650 milliGRAM(s) Oral every 6 hours PRN Temp greater or equal to 38C (100.4F), Mild Pain (1 - 3)  aluminum hydroxide/magnesium hydroxide/simethicone Suspension 30 milliLiter(s) Oral every 4 hours PRN Dyspepsia  dextrose Oral Gel 15 Gram(s) Oral once PRN Blood Glucose LESS THAN 70 milliGRAM(s)/deciliter  melatonin 3 milliGRAM(s) Oral at bedtime PRN Insomnia  ondansetron Injectable 4 milliGRAM(s) IV Push every 8 hours PRN Nausea and/or Vomiting    CXR reviewed    
Patient is a 42y old  Male who presents with a chief complaint of DKA (24 Sep 2022 11:33)      INTERVAL HPI/OVERNIGHT EVENTS:   No overnight events   gap closed, switched from insulin drip and d5 to LR and subQ insulin    ICU Vital Signs Last 24 Hrs  T(C): 38 (24 Sep 2022 23:00), Max: 38.3 (24 Sep 2022 22:00)  T(F): 100.4 (24 Sep 2022 23:00), Max: 100.9 (24 Sep 2022 22:00)  HR: 99 (24 Sep 2022 23:00) (82 - 99)  BP: 113/69 (24 Sep 2022 23:00) (89/58 - 113/69)  BP(mean): 85 (24 Sep 2022 20:14) (69 - 85)  ABP: --  ABP(mean): --  RR: 18 (24 Sep 2022 23:00) (15 - 18)  SpO2: 99% (24 Sep 2022 23:00) (95% - 99%)    O2 Parameters below as of 24 Sep 2022 23:00  Patient On (Oxygen Delivery Method): room air          I&O's Summary    23 Sep 2022 07:01  -  24 Sep 2022 07:00  --------------------------------------------------------  IN: 2200 mL / OUT: 0 mL / NET: 2200 mL          LABS:                        12.7   10.24 )-----------( 244      ( 24 Sep 2022 06:49 )             37.0     09    135  |  105  |  9<L>  ----------------------------<  147<H>  3.6   |  17  |  0.8    Ca    8.6      24 Sep 2022 21:01  Phos  1.3       Mg     1.5         TPro  5.5<L>  /  Alb  3.3<L>  /  TBili  0.3  /  DBili  x   /  AST  8   /  ALT  8   /  AlkPhos  75        Urinalysis Basic - ( 23 Sep 2022 11:27 )    Color: Light Yellow / Appearance: Clear / S.029 / pH: x  Gluc: x / Ketone: Large  / Bili: Negative / Urobili: <2 mg/dL   Blood: x / Protein: 30 mg/dL / Nitrite: Negative   Leuk Esterase: Negative / RBC: 6 /HPF / WBC 1 /HPF   Sq Epi: x / Non Sq Epi: 2 /HPF / Bacteria: Negative      CAPILLARY BLOOD GLUCOSE      POCT Blood Glucose.: 196 mg/dL (25 Sep 2022 00:48)  POCT Blood Glucose.: 240 mg/dL (24 Sep 2022 23:24)  POCT Blood Glucose.: 312 mg/dL (24 Sep 2022 21:52)  POCT Blood Glucose.: 132 mg/dL (24 Sep 2022 20:09)  POCT Blood Glucose.: 144 mg/dL (24 Sep 2022 19:15)  POCT Blood Glucose.: 131 mg/dL (24 Sep 2022 16:55)  POCT Blood Glucose.: 123 mg/dL (24 Sep 2022 15:14)  POCT Blood Glucose.: 193 mg/dL (24 Sep 2022 11:23)  POCT Blood Glucose.: 224 mg/dL (24 Sep 2022 07:09)  POCT Blood Glucose.: 192 mg/dL (24 Sep 2022 05:44)  POCT Blood Glucose.: 134 mg/dL (24 Sep 2022 01:29)    ABG - ( 23 Sep 2022 13:58 )  pH, Arterial: 7.14  pH, Blood: x     /  pCO2: 14    /  pO2: 113   / HCO3: 5     / Base Excess: -21.8 /  SaO2: 97.4                RADIOLOGY & ADDITIONAL TESTS:    Consultant(s) Notes Reviewed:  [x ] YES  [ ] NO    MEDICATIONS  (STANDING):  atorvastatin Oral Tab/Cap - Peds 10 milliGRAM(s) Oral daily  benzonatate 100 milliGRAM(s) Oral three times a day  chlorhexidine 2% Cloths 1 Application(s) Topical <User Schedule>  glucagon  Injectable 1 milliGRAM(s) IntraMuscular once  heparin   Injectable 5000 Unit(s) SubCutaneous every 12 hours  insulin glargine Injectable (LANTUS) 12 Unit(s) SubCutaneous at bedtime  insulin lispro Injectable (ADMELOG) 4 Unit(s) SubCutaneous three times a day before meals  lactated ringers. 1000 milliLiter(s) (75 mL/Hr) IV Continuous <Continuous>  magnesium sulfate  IVPB 2 Gram(s) IV Intermittent every 2 hours  pantoprazole  Injectable 40 milliGRAM(s) IV Push daily  potassium phosphate IVPB 30 milliMole(s) IV Intermittent once  sodium bicarbonate 650 milliGRAM(s) Oral every 8 hours    MEDICATIONS  (PRN):  acetaminophen     Tablet .. 650 milliGRAM(s) Oral every 6 hours PRN Temp greater or equal to 38C (100.4F), Mild Pain (1 - 3)  aluminum hydroxide/magnesium hydroxide/simethicone Suspension 30 milliLiter(s) Oral every 4 hours PRN Dyspepsia  guaifenesin/dextromethorphan Oral Liquid 10 milliLiter(s) Oral every 6 hours PRN Cough  melatonin 3 milliGRAM(s) Oral at bedtime PRN Insomnia  ondansetron Injectable 4 milliGRAM(s) IV Push every 8 hours PRN Nausea and/or Vomiting      PHYSICAL EXAM:  GENERAL:   HEAD:  Atraumatic, Normocephalic  EYES: EOMI, PERRLA, conjunctiva and sclera clear  NECK: Supple, No JVD, Normal thyroid, no enlarged nodes  NERVOUS SYSTEM:  Alert & Awake.   CHEST/LUNG: B/L good air entry; No rales, rhonchi, or wheezing  HEART: S1S2 normal, no S3, Regular rate and rhythm; No murmurs  ABDOMEN: Soft, Nontender, Nondistended; Bowel sounds present  EXTREMITIES:  2+ Peripheral Pulses, No clubbing, cyanosis, or edema  LYMPH: No lymphadenopathy noted  SKIN: No rashes or lesions    Care Discussed with Consultants/Other Providers [ x] YES  [ ] NO
Patient is a 42y old  Male who presents with a chief complaint of DKA (25 Sep 2022 16:49)        Over Night Events: Off insulin drip. Tolerating PO diet.         ROS:  See HPI    PHYSICAL EXAM    ICU Vital Signs Last 24 Hrs  T(C): 36.3 (26 Sep 2022 08:00), Max: 36.8 (25 Sep 2022 12:00)  T(F): 97.4 (26 Sep 2022 08:00), Max: 98.2 (25 Sep 2022 12:00)  HR: 62 (26 Sep 2022 09:10) (60 - 88)  BP: 122/78 (26 Sep 2022 09:10) (102/75 - 125/81)  BP(mean): 94 (26 Sep 2022 09:10) (80 - 96)  RR: 25 (26 Sep 2022 09:10) (18 - 37)  SpO2: 96% (26 Sep 2022 09:10) (95% - 98%)    O2 Parameters below as of 26 Sep 2022 09:10  Patient On (Oxygen Delivery Method): room air        09-25-22 @ 07:01  -  09-26-22 @ 07:00  --------------------------------------------------------  IN:    IV PiggyBack: 250 mL    Lactated Ringers: 1800 mL    Oral Fluid: 200 mL  Total IN: 2250 mL    OUT:    Voided (mL): 1550 mL  Total OUT: 1550 mL    Total NET: 700 mL      09-26-22 @ 07:01  -  09-26-22 @ 10:15  --------------------------------------------------------  IN:    Lactated Ringers: 225 mL  Total IN: 225 mL    OUT:    Voided (mL): 600 mL  Total OUT: 600 mL    Total NET: -375 mL      CONSTITUTIONAL:  In NAD    ENT:   Airway patent,   No thrush    EYES:   Clear bilaterally,   pupils equal,   round and reactive to light.    CARDIAC:   Normal rate,   regular rhythm.    no edema    CAROTID:   normal systolic impulse  no bruits    RESPIRATORY:   No wheezing  Normal chest expansion  Not tachypneic,  No use of accessory muscles    GASTROINTESTINAL:  Abdomen soft,   non-tender,   no guarding,   + BS    MUSCULOSKELETAL:   range of motion is not limited,  no clubbing, cyanosis    NEUROLOGICAL:   Alert and oriented   no motor deficits.      LABS:                        10.2   6.88  )-----------( 217      ( 26 Sep 2022 05:05 )             29.6                                               09-26    144  |  109  |  12  ----------------------------<  73  3.4<L>   |  26  |  0.6<L>    Ca    8.1<L>      26 Sep 2022 05:05  Phos  2.7     09-25  Mg     1.8     09-26    TPro  4.7<L>  /  Alb  2.9<L>  /  TBili  0.3  /  DBili  <0.2  /  AST  10  /  ALT  9   /  AlkPhos  63  09-26      PT/INR - ( 26 Sep 2022 05:05 )   PT: 13.10 sec;   INR: 1.14 ratio                                               LIVER FUNCTIONS - ( 26 Sep 2022 05:05 )  Alb: 2.9 g/dL / Pro: 4.7 g/dL / ALK PHOS: 63 U/L / ALT: 9 U/L / AST: 10 U/L / GGT: x                    C-Reactive Protein, Serum: 90.1 mg/L (09-26-22 @ 05:05)  C-Reactive Protein, Serum: 153.0 mg/L (09-23-22 @ 16:35)  D-Dimer Assay, Quantitative: 608 ng/mL DDU (09-23-22 @ 16:35)  Procalcitonin, Serum: 0.88 ng/mL (09-23-22 @ 16:35)                    POCT Blood Glucose.: 98 mg/dL (09-26-22 @ 08:21)  POCT Blood Glucose.: 81 mg/dL (09-26-22 @ 06:17)  POCT Blood Glucose.: 100 mg/dL (09-26-22 @ 02:16)  POCT Blood Glucose.: 120 mg/dL (09-26-22 @ 00:22)  POCT Blood Glucose.: 227 mg/dL (09-25-22 @ 21:31)  POCT Blood Glucose.: 378 mg/dL (09-25-22 @ 16:51)  POCT Blood Glucose.: 224 mg/dL (09-25-22 @ 11:40)                                                             MEDICATIONS  (STANDING):  atorvastatin Oral Tab/Cap - Peds 10 milliGRAM(s) Oral daily  benzonatate 100 milliGRAM(s) Oral three times a day  chlorhexidine 2% Cloths 1 Application(s) Topical <User Schedule>  glucagon  Injectable 1 milliGRAM(s) IntraMuscular once  heparin   Injectable 5000 Unit(s) SubCutaneous every 12 hours  influenza   Vaccine 0.5 milliLiter(s) IntraMuscular once  insulin glargine Injectable (LANTUS) 12 Unit(s) SubCutaneous at bedtime  insulin lispro (ADMELOG) corrective regimen sliding scale   SubCutaneous three times a day before meals  insulin lispro Injectable (ADMELOG) 4 Unit(s) SubCutaneous three times a day before meals  lactated ringers. 1000 milliLiter(s) (75 mL/Hr) IV Continuous <Continuous>  pantoprazole    Tablet 40 milliGRAM(s) Oral before breakfast  remdesivir  IVPB   IV Intermittent   remdesivir  IVPB 100 milliGRAM(s) IV Intermittent every 24 hours  sodium bicarbonate 650 milliGRAM(s) Oral every 8 hours    MEDICATIONS  (PRN):  acetaminophen     Tablet .. 650 milliGRAM(s) Oral every 6 hours PRN Temp greater or equal to 38C (100.4F), Mild Pain (1 - 3)  aluminum hydroxide/magnesium hydroxide/simethicone Suspension 30 milliLiter(s) Oral every 4 hours PRN Dyspepsia  guaifenesin/dextromethorphan Oral Liquid 10 milliLiter(s) Oral every 6 hours PRN Cough  melatonin 3 milliGRAM(s) Oral at bedtime PRN Insomnia  ondansetron Injectable 4 milliGRAM(s) IV Push every 8 hours PRN Nausea and/or Vomiting      Xrays:                                                                                     ECHO    
Patient is a 42y old  Male who presents with a chief complaint of DKA (26 Sep 2022 10:14)      INTERVAL HPI/OVERNIGHT EVENTS:   No overnight events   Afebrile, hemodynamically stable     ICU Vital Signs Last 24 Hrs  T(C): 36.7 (26 Sep 2022 18:00), Max: 36.7 (26 Sep 2022 18:00)  T(F): 98 (26 Sep 2022 18:00), Max: 98 (26 Sep 2022 18:00)  HR: 60 (26 Sep 2022 18:00) (56 - 80)  BP: 120/71 (26 Sep 2022 18:00) (104/71 - 134/90)  BP(mean): 87 (26 Sep 2022 18:00) (80 - 114)  ABP: --  ABP(mean): --  RR: 32 (26 Sep 2022 18:00) (18 - 38)  SpO2: 98% (26 Sep 2022 18:00) (95% - 98%)    O2 Parameters below as of 26 Sep 2022 18:00  Patient On (Oxygen Delivery Method): room air          I&O's Summary    25 Sep 2022 07:01  -  26 Sep 2022 07:00  --------------------------------------------------------  IN: 2250 mL / OUT: 1550 mL / NET: 700 mL    26 Sep 2022 07:01  -  26 Sep 2022 19:58  --------------------------------------------------------  IN: 575 mL / OUT: 1650 mL / NET: -1075 mL          LABS:                        10.2   6.88  )-----------( 217      ( 26 Sep 2022 05:05 )             29.6     09-26    144  |  109  |  12  ----------------------------<  73  3.4<L>   |  26  |  0.6<L>    Ca    8.1<L>      26 Sep 2022 05:05  Phos  2.7     09-25  Mg     1.8     09-26    TPro  4.7<L>  /  Alb  2.9<L>  /  TBili  0.3  /  DBili  <0.2  /  AST  10  /  ALT  9   /  AlkPhos  63  09-26    PT/INR - ( 26 Sep 2022 05:05 )   PT: 13.10 sec;   INR: 1.14 ratio             CAPILLARY BLOOD GLUCOSE      POCT Blood Glucose.: 167 mg/dL (26 Sep 2022 17:54)  POCT Blood Glucose.: 163 mg/dL (26 Sep 2022 12:27)  POCT Blood Glucose.: 98 mg/dL (26 Sep 2022 08:21)  POCT Blood Glucose.: 81 mg/dL (26 Sep 2022 06:17)  POCT Blood Glucose.: 100 mg/dL (26 Sep 2022 02:16)  POCT Blood Glucose.: 120 mg/dL (26 Sep 2022 00:22)  POCT Blood Glucose.: 227 mg/dL (25 Sep 2022 21:31)        RADIOLOGY & ADDITIONAL TESTS:    Consultant(s) Notes Reviewed:  [x ] YES  [ ] NO    MEDICATIONS  (STANDING):  atorvastatin Oral Tab/Cap - Peds 10 milliGRAM(s) Oral daily  benzonatate 100 milliGRAM(s) Oral three times a day  chlorhexidine 2% Cloths 1 Application(s) Topical <User Schedule>  glucagon  Injectable 1 milliGRAM(s) IntraMuscular once  heparin   Injectable 5000 Unit(s) SubCutaneous every 12 hours  influenza   Vaccine 0.5 milliLiter(s) IntraMuscular once  insulin glargine Injectable (LANTUS) 12 Unit(s) SubCutaneous at bedtime  insulin lispro (ADMELOG) corrective regimen sliding scale   SubCutaneous three times a day before meals  insulin lispro Injectable (ADMELOG) 4 Unit(s) SubCutaneous three times a day before meals  pantoprazole    Tablet 40 milliGRAM(s) Oral before breakfast  potassium chloride    Tablet ER 20 milliEquivalent(s) Oral once  remdesivir  IVPB   IV Intermittent   remdesivir  IVPB 100 milliGRAM(s) IV Intermittent every 24 hours  sodium bicarbonate 650 milliGRAM(s) Oral every 8 hours    MEDICATIONS  (PRN):  acetaminophen     Tablet .. 650 milliGRAM(s) Oral every 6 hours PRN Temp greater or equal to 38C (100.4F), Mild Pain (1 - 3)  aluminum hydroxide/magnesium hydroxide/simethicone Suspension 30 milliLiter(s) Oral every 4 hours PRN Dyspepsia  guaifenesin/dextromethorphan Oral Liquid 10 milliLiter(s) Oral every 6 hours PRN Cough  melatonin 3 milliGRAM(s) Oral at bedtime PRN Insomnia  ondansetron Injectable 4 milliGRAM(s) IV Push every 8 hours PRN Nausea and/or Vomiting      PHYSICAL EXAM:  GENERAL:   HEAD:  Atraumatic, Normocephalic  EYES: EOMI, PERRLA, conjunctiva and sclera clear  NECK: Supple, No JVD, Normal thyroid, no enlarged nodes  NERVOUS SYSTEM:  Alert & Awake.   CHEST/LUNG: B/L good air entry; No rales, rhonchi, or wheezing  HEART: S1S2 normal, no S3, Regular rate and rhythm; No murmurs  ABDOMEN: Soft, Nontender, Nondistended; Bowel sounds present  EXTREMITIES:  2+ Peripheral Pulses, No clubbing, cyanosis, or edema  LYMPH: No lymphadenopathy noted  SKIN: No rashes or lesions    Care Discussed with Consultants/Other Providers [ x] YES  [ ] NO

## 2022-09-27 ENCOUNTER — TRANSCRIPTION ENCOUNTER (OUTPATIENT)
Age: 42
End: 2022-09-27

## 2022-09-27 VITALS
SYSTOLIC BLOOD PRESSURE: 125 MMHG | HEART RATE: 68 BPM | RESPIRATION RATE: 18 BRPM | DIASTOLIC BLOOD PRESSURE: 80 MMHG | OXYGEN SATURATION: 97 % | TEMPERATURE: 98 F

## 2022-09-27 LAB
ALBUMIN SERPL ELPH-MCNC: 2.9 G/DL — LOW (ref 3.5–5.2)
ALBUMIN SERPL ELPH-MCNC: 3 G/DL — LOW (ref 3.5–5.2)
ALP SERPL-CCNC: 68 U/L — SIGNIFICANT CHANGE UP (ref 30–115)
ALP SERPL-CCNC: 72 U/L — SIGNIFICANT CHANGE UP (ref 30–115)
ALT FLD-CCNC: 8 U/L — SIGNIFICANT CHANGE UP (ref 0–41)
ALT FLD-CCNC: 9 U/L — SIGNIFICANT CHANGE UP (ref 0–41)
ANION GAP SERPL CALC-SCNC: 14 MMOL/L — SIGNIFICANT CHANGE UP (ref 7–14)
AST SERPL-CCNC: 11 U/L — SIGNIFICANT CHANGE UP (ref 0–41)
AST SERPL-CCNC: 11 U/L — SIGNIFICANT CHANGE UP (ref 0–41)
BASOPHILS # BLD AUTO: 0.04 K/UL — SIGNIFICANT CHANGE UP (ref 0–0.2)
BASOPHILS NFR BLD AUTO: 0.5 % — SIGNIFICANT CHANGE UP (ref 0–1)
BILIRUB DIRECT SERPL-MCNC: <0.2 MG/DL — SIGNIFICANT CHANGE UP (ref 0–0.3)
BILIRUB INDIRECT FLD-MCNC: >0.2 MG/DL — SIGNIFICANT CHANGE UP (ref 0.2–1.2)
BILIRUB SERPL-MCNC: 0.4 MG/DL — SIGNIFICANT CHANGE UP (ref 0.2–1.2)
BILIRUB SERPL-MCNC: 0.4 MG/DL — SIGNIFICANT CHANGE UP (ref 0.2–1.2)
BUN SERPL-MCNC: 13 MG/DL — SIGNIFICANT CHANGE UP (ref 10–20)
CALCIUM SERPL-MCNC: 8.7 MG/DL — SIGNIFICANT CHANGE UP (ref 8.4–10.5)
CHLORIDE SERPL-SCNC: 103 MMOL/L — SIGNIFICANT CHANGE UP (ref 98–110)
CO2 SERPL-SCNC: 24 MMOL/L — SIGNIFICANT CHANGE UP (ref 17–32)
CREAT SERPL-MCNC: 0.6 MG/DL — LOW (ref 0.7–1.5)
EGFR: 124 ML/MIN/1.73M2 — SIGNIFICANT CHANGE UP
EOSINOPHIL # BLD AUTO: 0.05 K/UL — SIGNIFICANT CHANGE UP (ref 0–0.7)
EOSINOPHIL NFR BLD AUTO: 0.6 % — SIGNIFICANT CHANGE UP (ref 0–8)
GLUCOSE BLDC GLUCOMTR-MCNC: 194 MG/DL — HIGH (ref 70–99)
GLUCOSE BLDC GLUCOMTR-MCNC: 196 MG/DL — HIGH (ref 70–99)
GLUCOSE SERPL-MCNC: 213 MG/DL — HIGH (ref 70–99)
HCT VFR BLD CALC: 34.2 % — LOW (ref 42–52)
HGB BLD-MCNC: 11.7 G/DL — LOW (ref 14–18)
IMM GRANULOCYTES NFR BLD AUTO: 1.4 % — HIGH (ref 0.1–0.3)
INR BLD: 1.23 RATIO — SIGNIFICANT CHANGE UP (ref 0.65–1.3)
LYMPHOCYTES # BLD AUTO: 1.79 K/UL — SIGNIFICANT CHANGE UP (ref 1.2–3.4)
LYMPHOCYTES # BLD AUTO: 20.7 % — SIGNIFICANT CHANGE UP (ref 20.5–51.1)
MAGNESIUM SERPL-MCNC: 1.7 MG/DL — LOW (ref 1.8–2.4)
MCHC RBC-ENTMCNC: 29.1 PG — SIGNIFICANT CHANGE UP (ref 27–31)
MCHC RBC-ENTMCNC: 34.2 G/DL — SIGNIFICANT CHANGE UP (ref 32–37)
MCV RBC AUTO: 85.1 FL — SIGNIFICANT CHANGE UP (ref 80–94)
MONOCYTES # BLD AUTO: 0.86 K/UL — HIGH (ref 0.1–0.6)
MONOCYTES NFR BLD AUTO: 10 % — HIGH (ref 1.7–9.3)
NEUTROPHILS # BLD AUTO: 5.78 K/UL — SIGNIFICANT CHANGE UP (ref 1.4–6.5)
NEUTROPHILS NFR BLD AUTO: 66.8 % — SIGNIFICANT CHANGE UP (ref 42.2–75.2)
NRBC # BLD: 0 /100 WBCS — SIGNIFICANT CHANGE UP (ref 0–0)
PLATELET # BLD AUTO: 230 K/UL — SIGNIFICANT CHANGE UP (ref 130–400)
POTASSIUM SERPL-MCNC: 4.2 MMOL/L — SIGNIFICANT CHANGE UP (ref 3.5–5)
POTASSIUM SERPL-SCNC: 4.2 MMOL/L — SIGNIFICANT CHANGE UP (ref 3.5–5)
PROT SERPL-MCNC: 5.1 G/DL — LOW (ref 6–8)
PROT SERPL-MCNC: 5.1 G/DL — LOW (ref 6–8)
PROTHROM AB SERPL-ACNC: 14.1 SEC — HIGH (ref 9.95–12.87)
RBC # BLD: 4.02 M/UL — LOW (ref 4.7–6.1)
RBC # FLD: 12.4 % — SIGNIFICANT CHANGE UP (ref 11.5–14.5)
SODIUM SERPL-SCNC: 141 MMOL/L — SIGNIFICANT CHANGE UP (ref 135–146)
WBC # BLD: 8.64 K/UL — SIGNIFICANT CHANGE UP (ref 4.8–10.8)
WBC # FLD AUTO: 8.64 K/UL — SIGNIFICANT CHANGE UP (ref 4.8–10.8)

## 2022-09-27 PROCEDURE — 99239 HOSP IP/OBS DSCHRG MGMT >30: CPT

## 2022-09-27 PROCEDURE — 93010 ELECTROCARDIOGRAM REPORT: CPT

## 2022-09-27 RX ORDER — ATORVASTATIN CALCIUM 80 MG/1
0 TABLET, FILM COATED ORAL
Qty: 0 | Refills: 0 | DISCHARGE

## 2022-09-27 RX ORDER — LISINOPRIL 2.5 MG/1
1 TABLET ORAL
Qty: 0 | Refills: 0 | DISCHARGE

## 2022-09-27 RX ORDER — INSULIN LISPRO 100/ML
5 VIAL (ML) SUBCUTANEOUS
Qty: 450 | Refills: 0
Start: 2022-09-27 | End: 2022-10-26

## 2022-09-27 RX ORDER — INSULIN DEGLUDEC 100 U/ML
0 INJECTION, SOLUTION SUBCUTANEOUS
Qty: 0 | Refills: 11 | DISCHARGE

## 2022-09-27 RX ORDER — EMPAGLIFLOZIN 10 MG/1
0 TABLET, FILM COATED ORAL
Qty: 0 | Refills: 0 | DISCHARGE

## 2022-09-27 RX ORDER — INSULIN GLARGINE 100 [IU]/ML
13 INJECTION, SOLUTION SUBCUTANEOUS AT BEDTIME
Refills: 0 | Status: DISCONTINUED | OUTPATIENT
Start: 2022-09-27 | End: 2022-09-27

## 2022-09-27 RX ORDER — INSULIN LISPRO 100/ML
5 VIAL (ML) SUBCUTANEOUS
Refills: 0 | Status: DISCONTINUED | OUTPATIENT
Start: 2022-09-27 | End: 2022-09-27

## 2022-09-27 RX ORDER — INSULIN LISPRO 100/ML
5 VIAL (ML) SUBCUTANEOUS
Qty: 2 | Refills: 0
Start: 2022-09-27 | End: 2022-10-26

## 2022-09-27 RX ORDER — INSULIN DEGLUDEC 100 U/ML
13 INJECTION, SOLUTION SUBCUTANEOUS
Qty: 390 | Refills: 2
Start: 2022-09-27 | End: 2022-12-25

## 2022-09-27 RX ORDER — ATORVASTATIN CALCIUM 80 MG/1
1 TABLET, FILM COATED ORAL
Qty: 0 | Refills: 0 | DISCHARGE

## 2022-09-27 RX ORDER — SITAGLIPTIN AND METFORMIN HYDROCHLORIDE 500; 50 MG/1; MG/1
1 TABLET, FILM COATED ORAL
Qty: 0 | Refills: 0 | DISCHARGE

## 2022-09-27 RX ORDER — LISINOPRIL 2.5 MG/1
0 TABLET ORAL
Qty: 0 | Refills: 0 | DISCHARGE

## 2022-09-27 RX ORDER — ATORVASTATIN CALCIUM 80 MG/1
1 TABLET, FILM COATED ORAL
Qty: 30 | Refills: 2
Start: 2022-09-27 | End: 2022-12-25

## 2022-09-27 RX ADMIN — Medication 2: at 08:16

## 2022-09-27 RX ADMIN — Medication 4 UNIT(S): at 08:16

## 2022-09-27 RX ADMIN — ATORVASTATIN CALCIUM 10 MILLIGRAM(S): 80 TABLET, FILM COATED ORAL at 11:45

## 2022-09-27 RX ADMIN — HEPARIN SODIUM 5000 UNIT(S): 5000 INJECTION INTRAVENOUS; SUBCUTANEOUS at 05:49

## 2022-09-27 RX ADMIN — Medication 2: at 11:44

## 2022-09-27 RX ADMIN — REMDESIVIR 500 MILLIGRAM(S): 5 INJECTION INTRAVENOUS at 13:29

## 2022-09-27 RX ADMIN — Medication 650 MILLIGRAM(S): at 05:45

## 2022-09-27 RX ADMIN — Medication 5 UNIT(S): at 11:44

## 2022-09-27 RX ADMIN — Medication 650 MILLIGRAM(S): at 13:28

## 2022-09-27 RX ADMIN — Medication 100 MILLIGRAM(S): at 05:45

## 2022-09-27 RX ADMIN — Medication 100 MILLIGRAM(S): at 13:29

## 2022-09-27 RX ADMIN — PANTOPRAZOLE SODIUM 40 MILLIGRAM(S): 20 TABLET, DELAYED RELEASE ORAL at 05:46

## 2022-09-27 NOTE — DISCHARGE NOTE PROVIDER - CARE PROVIDER_API CALL
Josue Marcial)  Family Medicine  [address] [place] [address]  Phone: [phone]  Fax: [phone]  Follow Up Time: 2 weeks   Josue Marcial)  Family Medicine  [address] [place] [address]  Phone: [phone]  Fax: [phone]  Follow Up Time: 2 weeks    MARTIN SERRANO  Internal Medicine  [address] [place] [address]  Phone: ([phone]  Fax: ()-  Follow Up Time: 1 week    Gin Suazo)  [address] [address]  Phone: [phone]  Fax: [phone]  Follow Up Time: 1 week   Josue Marcial)  Family Medicine  [address] [place] [address]  Phone: [phone]  Fax: [phone]  Follow Up Time: 2 weeks    [place] [address]  Phone: [phone]  Fax: [phone]  Follow Up Time: 1 week

## 2022-09-27 NOTE — DISCHARGE NOTE PROVIDER - ATTENDING DISCHARGE PHYSICAL EXAMINATION:
Patient seen and examined at bedside. No acute events overnight. No sob. DKA resolved. Wants to go home. Completed 3 days of RDV. Stable for d/c. Will send home with insulin, instructions to follow up with endocrinologist Dr. Rivera as outpatient.    PHYSICAL EXAM:  GENERAL: NAD, lying in bed comfortably  HEAD:  Atraumatic, Normocephalic  EYES: EOMI, PERRLA, conjunctiva and sclera clear  ENT: Moist mucous membranes  NECK: Supple, No JVD  CHEST/LUNG: Clear to auscultation bilaterally; No rales, rhonchi, wheezing, or rubs. Unlabored respirations  HEART: Regular rate and rhythm; No murmurs, rubs, or gallops  ABDOMEN: Bowel sounds present; Soft, Nontender, Nondistended. No hepatomegaly  EXTREMITIES:  2+ Peripheral Pulses, brisk capillary refill. No clubbing, cyanosis, or edema  NERVOUS SYSTEM:  Alert & Oriented X3, speech clear. No deficits   MSK: FROM all 4 extremities, full and equal strength  SKIN: No rashes or lesions    Vital Signs Last 24 Hrs  T(C): 36.7 (27 Sep 2022 09:30), Max: 37.2 (27 Sep 2022 04:59)  T(F): 98 (27 Sep 2022 09:30), Max: 98.9 (27 Sep 2022 04:59)  HR: 68 (27 Sep 2022 09:30) (55 - 68)  BP: 125/80 (27 Sep 2022 09:30) (120/71 - 127/80)  BP(mean): 93 (26 Sep 2022 21:20) (87 - 94)  RR: 18 (27 Sep 2022 09:30) (18 - 38)  SpO2: 97% (27 Sep 2022 09:30) (95% - 99%)    Parameters below as of 26 Sep 2022 22:38  Patient On (Oxygen Delivery Method): room air

## 2022-09-27 NOTE — DISCHARGE NOTE PROVIDER - HOSPITAL COURSE
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 Patient states that his symptoms started on monday where he was having fevers, shortness of breath and decreased po intake. Because he was not eating patient did not take his diabetes medications. He endorses one episode of vomiting which he had after taking paxlovid yesterday for the first time yesterday. Patient has not had any BM for 4 days now. he does endorse passing gas.     CT abdomen - Severely distended fluid-filled stomach with transition at the duodenum.  This could represent gastric outlet obstruction versus gastroparesis. Bilateral lower lung zone opacities as detailed above. Correlate for infectious/inflammatory etiologies.    Admitted for ICU monitoring for DKA and possible SBO.     In the ICU  Pt started on insulin drip and then transitioned on insulin Sub Q. Pt's anion gap has closed and DKA has improved.  ID rec RDV 5 day course for covid 19 infection. Pt does not have to finish course of RDV prior to discharge if clinically improving -- can finish course of Paxlovid at home. Pt Satting 96 on RA. Pt tolerating diabetic diet, surgery following, gastric distention likely due gastroparesis. Pt is stable for transfer.    Floor course:  Patient downgraded from ICU with closed anion gap, patient's fingerstick was monitored, sub-Q insulin regimen given and electrolytes monitored and remained stable on the floors. Patient is tolerating PO diet well this morning and had large BM. Patient is medically stable and cleared for discharge home 9/27.

## 2022-09-27 NOTE — DISCHARGE NOTE PROVIDER - NSDCCPCAREPLAN_GEN_ALL_CORE_FT
PRINCIPAL DISCHARGE DIAGNOSIS  Diagnosis: DKA (diabetic ketoacidosis)  Assessment and Plan of Treatment: Diabetic ketoacidosis (DKA) is a life-threatening condition caused by dangerously high blood sugar levels. Your blood sugar levels become high because your body does not have enough insulin. Insulin helps move sugar out of the blood so it can be used for energy. The lack of insulin forces your body to use fat instead of sugar for energy. As fats are broken down, they leave chemicals called ketones that build up in your blood. Ketones are dangerous at high levels.  You were treated for DKA with insulin in the hospital and were no longer in DKA prior to discharge.   DISCHARGE INSTRUCTIONS:  Call 911 for any of the following:  You have a seizure.  You begin to breathe fast, or are short of breath.  You become weak and confused.  Seek care immediately if:  You are more drowsy than usual.  Contact your healthcare provider if:  You have fruity, sweet breath.  You have severe, new stomach pain and are vomiting.  Your blood sugar level is lower or higher than your healthcare provider says it should be.  You have ketones in your blood or urine.  You have a fever or chills.  You are more thirsty than usual.  You are urinating more often than usual.  You have questions or concerns about your condition or care.  Medicines:  Insulin and diabetes medicine decreases the amount of sugar in your blood.  Take your medicine as directed. Contact your healthcare provider if you think your medicine is not helping or if you have side effects. Tell him or her if you are allergic to any medicine. Keep a list of the medicines, vitamins, and herbs you take. Include the amounts, and when and why you take them. Bring the list or the pill bottles to follow-up visits. Carry your medicine list with you in case of an emergency.  Help prevent DKA:  The best way to prevent DKA is to control your diabetes.   Monitor your blood sugar levels closely if you have an infection, are stressed, sick, or experience trauma        SECONDARY DISCHARGE DIAGNOSES  Diagnosis: Hyperkalemia  Assessment and Plan of Treatment:      PRINCIPAL DISCHARGE DIAGNOSIS  Diagnosis: DKA (diabetic ketoacidosis)  Assessment and Plan of Treatment: Diabetic ketoacidosis (DKA) is a life-threatening condition caused by dangerously high blood sugar levels. Your blood sugar levels become high because your body does not have enough insulin. Insulin helps move sugar out of the blood so it can be used for energy. The lack of insulin forces your body to use fat instead of sugar for energy. As fats are broken down, they leave chemicals called ketones that build up in your blood. Ketones are dangerous at high levels.  You were treated for DKA with insulin in the hospital and were no longer in DKA prior to discharge.   DISCHARGE INSTRUCTIONS:  Call 911 for any of the following:  You have a seizure.  You begin to breathe fast, or are short of breath.  You become weak and confused.  Seek care immediately if:  You are more drowsy than usual.  Contact your healthcare provider if:  You have fruity, sweet breath.  You have severe, new stomach pain and are vomiting.  Your blood sugar level is lower or higher than your healthcare provider says it should be.  You have ketones in your blood or urine.  You have a fever or chills.  You are more thirsty than usual.  You are urinating more often than usual.  You have questions or concerns about your condition or care.  Medicines:  Insulin and diabetes medicine decreases the amount of sugar in your blood.  Take your medicine as directed. Contact your healthcare provider if you think your medicine is not helping or if you have side effects. Tell him or her if you are allergic to any medicine. Keep a list of the medicines, vitamins, and herbs you take. Include the amounts, and when and why you take them. Bring the list or the pill bottles to follow-up visits. Carry your medicine list with you in case of an emergency.  Help prevent DKA:  The best way to prevent DKA is to control your diabetes.   Monitor your blood sugar levels closely if you have an infection, are stressed, sick, or experience trauma        SECONDARY DISCHARGE DIAGNOSES  Diagnosis: 2019 novel coronavirus disease (COVID-19)  Assessment and Plan of Treatment: You had covid, and received 3 days of remdesivir. You did not require any oxygen, and you are clinically much improved. Follow up with your primary care doctor.    Diagnosis: JANET (acute kidney injury)  Assessment and Plan of Treatment: You had an acute kidney injury due to DKA. It is resolved now due to fluids.

## 2022-09-27 NOTE — DISCHARGE NOTE NURSING/CASE MANAGEMENT/SOCIAL WORK - NSDCPEFALRISK_GEN_ALL_CORE
For information on Fall & Injury Prevention, visit: [email or web address] OR  [email or web address] OR  https://www.cdc.gov/steadi/patient.html

## 2022-09-27 NOTE — DISCHARGE NOTE PROVIDER - NSDCFUADDINST_GEN_ALL_CORE_FT
Please follow up with your primary care doctor as an outpatient for close management of your diabetes. Follow up with your endocrinologist in [place] after discharge. Please follow up with your primary care doctor as an outpatient for close management of your diabetes. Follow up with your endocrinologist in [place] after discharge.   Please take Tresiba (long acting insulin) 13 units once daily at night  Please take Humalog (meal time insulin) 5 units 3 times daily with meals  We increased your Lipitor to 40mg (from 10mg) daily which is the recommended dose for a diabetic for primary prevention of coronary artery disease

## 2022-09-27 NOTE — DISCHARGE NOTE PROVIDER - NSDCMRMEDTOKEN_GEN_ALL_CORE_FT
atorvastatin 10 mg oral tablet: 1 tab(s) orally once a day  lisinopril 5 mg oral tablet: 1 tab(s) orally once a day   HumaLOG 100 units/mL injectable solution: 5 unit(s) injectable 3 times a day (with meals)   Lipitor 40 mg oral tablet: 1 tab(s) orally once a day   lisinopril 5 mg oral tablet: 1 tab(s) orally once a day  TRESIBA FLEXTOUCH 100 UNIT/ML: 13 unit(s) subcutaneous once a day (at bedtime)

## 2022-09-27 NOTE — DISCHARGE NOTE NURSING/CASE MANAGEMENT/SOCIAL WORK - PATIENT PORTAL LINK FT
You can access the FollowMyHealth Patient Portal offered by [facility] by registering at the following website: [email or web address]. By joining [employer] [facility]’s FollowMyHealth portal, you will also be able to view your health information using other applications (apps) compatible with our system.

## 2022-09-27 NOTE — DISCHARGE NOTE PROVIDER - NSDCFUADDAPPT_GEN_ALL_CORE_FT
Please follow up with your endocrinologist Dr. Rivera in [place] after discharge. Please follow up with your endocrinologist Dr. Rivera in [place] after discharge. If there is a long wait to see him, you can call Dr. Suazo to schedule an appt if you want.  Please follow up with Dr. Jean Baptiste, endocrinologist to book an appointment

## 2022-09-27 NOTE — DISCHARGE NOTE PROVIDER - CARE PROVIDERS DIRECT ADDRESSES
,[email or web address],DirectAddress_Unknown,DirectAddress_Unknown ,[email or web address],DirectAddress_Unknown

## 2022-09-27 NOTE — DISCHARGE NOTE PROVIDER - PROVIDER TOKENS
PROVIDER:[TOKEN:[[id number]],FOLLOWUP:[2 weeks]] PROVIDER:[TOKEN:[[id number]],FOLLOWUP:[2 weeks]],PROVIDER:[TOKEN:[[id number]],FOLLOWUP:[1 week]],PROVIDER:[TOKEN:[[id number]],FOLLOWUP:[1 week]] PROVIDER:[TOKEN:[[id number]],FOLLOWUP:[2 weeks]],PROVIDER:[TOKEN:[[id number]],FOLLOWUP:[1 week]]

## 2022-09-28 PROBLEM — E11.9 TYPE 2 DIABETES MELLITUS WITHOUT COMPLICATIONS: Chronic | Status: ACTIVE | Noted: 2022-09-23

## 2022-10-05 ENCOUNTER — APPOINTMENT (OUTPATIENT)
Dept: ENDOCRINOLOGY | Facility: CLINIC | Age: 42
End: 2022-10-05

## 2022-10-05 DIAGNOSIS — E13.10 OTHER SPECIFIED DIABETES MELLITUS WITH KETOACIDOSIS WITHOUT COMA: ICD-10-CM

## 2022-10-05 DIAGNOSIS — E86.1 HYPOVOLEMIA: ICD-10-CM

## 2022-10-05 DIAGNOSIS — E83.42 HYPOMAGNESEMIA: ICD-10-CM

## 2022-10-05 DIAGNOSIS — K31.84 GASTROPARESIS: ICD-10-CM

## 2022-10-05 DIAGNOSIS — E11.10 TYPE 2 DIABETES MELLITUS WITH KETOACIDOSIS WITHOUT COMA: ICD-10-CM

## 2022-10-05 DIAGNOSIS — Z79.84 LONG TERM (CURRENT) USE OF ORAL HYPOGLYCEMIC DRUGS: ICD-10-CM

## 2022-10-05 DIAGNOSIS — U07.1 COVID-19: ICD-10-CM

## 2022-10-05 DIAGNOSIS — Z86.39 PERSONAL HISTORY OF OTHER ENDOCRINE, NUTRITIONAL AND METABOLIC DISEASE: ICD-10-CM

## 2022-10-05 DIAGNOSIS — Z91.14 PATIENT'S OTHER NONCOMPLIANCE WITH MEDICATION REGIMEN: ICD-10-CM

## 2022-10-05 DIAGNOSIS — Z79.4 LONG TERM (CURRENT) USE OF INSULIN: ICD-10-CM

## 2022-10-05 DIAGNOSIS — N17.9 ACUTE KIDNEY FAILURE, UNSPECIFIED: ICD-10-CM

## 2022-10-05 DIAGNOSIS — E13.43 OTHER SPECIFIED DIABETES MELLITUS WITH DIABETIC AUTONOMIC (POLY)NEUROPATHY: ICD-10-CM

## 2022-10-05 DIAGNOSIS — E87.5 HYPERKALEMIA: ICD-10-CM

## 2022-10-05 PROCEDURE — 99215 OFFICE O/P EST HI 40 MIN: CPT | Mod: 95

## 2022-10-05 RX ORDER — INSULIN PMP CART,AUT,G6/7,CNTR
EACH SUBCUTANEOUS
Qty: 1 | Refills: 0 | Status: ACTIVE | COMMUNITY
Start: 2022-10-05 | End: 1900-01-01

## 2022-10-05 RX ORDER — BLOOD-GLUCOSE,RECEIVER,CONT
EACH MISCELLANEOUS
Qty: 1 | Refills: 5 | Status: ACTIVE | COMMUNITY
Start: 2022-10-05 | End: 1900-01-01

## 2022-10-05 NOTE — PHYSICAL EXAM
[Alert] : alert [No Acute Distress] : no acute distress [No Proptosis] : no proptosis [No Lid Lag] : no lid lag [No Respiratory Distress] : no respiratory distress [No Accessory Muscle Use] : no accessory muscle use [No Stigmata of Cushings Syndrome] : no stigmata of Cushings Syndrome [Oriented x3] : oriented to person, place, and time [de-identified] : telehealth

## 2022-10-05 NOTE — HISTORY OF PRESENT ILLNESS
[Home] : at home, [unfilled] , at the time of the visit. [Medical Office: ([facility])___] : at the medical office located in  [Verbal consent obtained from patient] : the patient, [unfilled] [FreeTextEntry1] : Mr. IRMA ENNIS  Is  a 42 year  old male whith known HECTOR , who was recently admitted to the hospital with DKA  who request telehealth visit as his FS are uncontrolled \par Previous endo : marilynttan , now decided to switch care \par \par \par Diagnosis :2-3 years ago \par Current Regimen:  tresiba 16 units at bedtime and Humalog 10 units TIDAC \par Previous regimens: jardiance and januvia ( stopped since DKA 9/2022)\par Compliance: very good \par SMBG/CGM :  has Freestyle tiffany now \par Hypoglycemia:no \par Polyuria/polydipsia : no \par Weight change/BMI: stable \par Diet: tring to watch carbs \par Exercise: active \par HBa1c trend: \par \par \par \par .prevention  \par Statin: lipitor 40 mg daily \par ACE/ARB :lisinopril 5 mg \par Eye examination: due \par Neuropathy: no\par \par

## 2022-10-05 NOTE — REVIEW OF SYSTEMS
[Fatigue] : fatigue [As Noted in HPI] : as noted in HPI [Recent Weight Gain (___ Lbs)] : no recent weight gain [Recent Weight Loss (___ Lbs)] : no recent weight loss [Visual Field Defect] : no visual field defect [Blurred Vision] : no blurred vision [Dysphagia] : no dysphagia [Neck Pain] : no neck pain [Dysphonia] : no dysphonia [Chest Pain] : no chest pain [Palpitations] : no palpitations [Fast Heart Rate] : heart rate is not fast [Lower Ext Edema] : no lower extremity edema [Shortness Of Breath] : no shortness of breath [SOB on Exertion] : no shortness of breath on exertion [Nausea] : no nausea [Constipation] : no constipation [Vomiting] : no vomiting [Diarrhea] : no diarrhea [Headaches] : no headaches [Dizziness] : no dizziness [Pain/Numbness of Digits] : no pain/numbness of digits [Cold Intolerance] : no cold intolerance [Heat Intolerance] : no heat intolerance

## 2022-10-05 NOTE — ASSESSMENT
[Carbohydrate Consistent Diet] : carbohydrate consistent diet [Importance of Diet and Exercise] : importance of diet and exercise to improve glycemic control, achieve weight loss and improve cardiovascular health [Exercise/Effect on Glucose] : exercise/effect on glucose [Self Monitoring of Blood Glucose] : self monitoring of blood glucose [FreeTextEntry1] : Mr. IRMA ENNIS  Is  a 42 year  old male whith known HECTOR , who was recently admitted to the hospital with DKA  who request telehealth visit as his FS are uncontrolled \par Previous endo : [facility]arshal , now decided to switch care \par \par #HECTOR / type 1. DM / recent DKA and now hyperglycemia \par - current regimen ( since hospital discharge )  tresiba 16 units at bedtime and yesterday increased to Humalog 10 units TIDAC , \par - CGM review with numbers in high 170 and 200s , patient reports excessive sweating at night \par - check Bg at night , if low BG will decrease tresiba to 14 units if high then will up it to 18 to target am fasting < 130 \par - increase humalog to 12 units TIDAC now\par - patient is on insulin 4 times/ day and need to be checking FS at least 4-5 times / day and when symptomatic and will benefit from CGM , considering switch to dexcom and planning to have close loop with Omnipod 5 , will start the process\par - reviewed carb counting and correction , rodriguez end to DM educator for more teaching \par - will review cgm next week and make more adjustment if needed until patient has insulin pump \par \par - labs to be done and continue lisinopril and statin for now \par \par coordinating care for insulin pump and education \par

## 2022-10-05 NOTE — THERAPY
[Today's Date] : [unfilled] [Tresiba] : Tresiba [Humalog] : Humalog [FreeTextEntry9] : 16 units  [de-identified] : 10/10/10

## 2022-10-12 ENCOUNTER — APPOINTMENT (OUTPATIENT)
Dept: ENDOCRINOLOGY | Facility: CLINIC | Age: 42
End: 2022-10-12

## 2022-10-13 RX ORDER — CALCIUM CARB/VITAMIN D3/VIT K1 500-100-40
31G X 5/16" TABLET,CHEWABLE ORAL
Qty: 1 | Refills: 3 | Status: ACTIVE | COMMUNITY
Start: 2022-10-13 | End: 1900-01-01

## 2022-10-14 ENCOUNTER — OUTPATIENT (OUTPATIENT)
Dept: OUTPATIENT SERVICES | Facility: HOSPITAL | Age: 42
LOS: 1 days | Discharge: HOME | End: 2022-10-14

## 2022-10-14 ENCOUNTER — APPOINTMENT (OUTPATIENT)
Dept: NUTRITION | Facility: CLINIC | Age: 42
End: 2022-10-14

## 2022-10-19 ENCOUNTER — NON-APPOINTMENT (OUTPATIENT)
Age: 42
End: 2022-10-19

## 2022-10-19 ENCOUNTER — OUTPATIENT (OUTPATIENT)
Dept: OUTPATIENT SERVICES | Facility: HOSPITAL | Age: 42
LOS: 1 days | Discharge: HOME | End: 2022-10-19

## 2022-10-19 ENCOUNTER — APPOINTMENT (OUTPATIENT)
Dept: NUTRITION | Facility: CLINIC | Age: 42
End: 2022-10-19

## 2022-10-19 DIAGNOSIS — E13.9 OTHER SPECIFIED DIABETES MELLITUS WITHOUT COMPLICATIONS: ICD-10-CM

## 2022-10-19 RX ORDER — BLOOD SUGAR DIAGNOSTIC
STRIP MISCELLANEOUS
Qty: 2 | Refills: 5 | Status: ACTIVE | COMMUNITY
Start: 2022-10-19 | End: 1900-01-01

## 2022-10-20 ENCOUNTER — NON-APPOINTMENT (OUTPATIENT)
Age: 42
End: 2022-10-20

## 2022-10-28 ENCOUNTER — OUTPATIENT (OUTPATIENT)
Dept: OUTPATIENT SERVICES | Facility: HOSPITAL | Age: 42
LOS: 1 days | Discharge: HOME | End: 2022-10-28

## 2022-10-28 ENCOUNTER — APPOINTMENT (OUTPATIENT)
Dept: NUTRITION | Facility: CLINIC | Age: 42
End: 2022-10-28

## 2022-10-28 ENCOUNTER — NON-APPOINTMENT (OUTPATIENT)
Age: 42
End: 2022-10-28

## 2022-11-02 ENCOUNTER — APPOINTMENT (OUTPATIENT)
Dept: NUTRITION | Facility: CLINIC | Age: 42
End: 2022-11-02

## 2022-11-28 ENCOUNTER — APPOINTMENT (OUTPATIENT)
Dept: ENDOCRINOLOGY | Facility: CLINIC | Age: 42
End: 2022-11-28

## 2022-11-28 VITALS
HEIGHT: 65 IN | BODY MASS INDEX: 28.49 KG/M2 | SYSTOLIC BLOOD PRESSURE: 112 MMHG | HEART RATE: 82 BPM | DIASTOLIC BLOOD PRESSURE: 70 MMHG | TEMPERATURE: 97.4 F | OXYGEN SATURATION: 98 % | WEIGHT: 171 LBS

## 2022-11-28 PROCEDURE — 99215 OFFICE O/P EST HI 40 MIN: CPT

## 2022-11-28 NOTE — HISTORY OF PRESENT ILLNESS
[Home] : at home, [unfilled] , at the time of the visit. [Medical Office: ([facility])___] : at the medical office located in  [Verbal consent obtained from patient] : the patient, [unfilled] [FreeTextEntry1] : Mr. IRMA ENNIS  Is  a 42 year  old male with  known HECTOR , who present for follow up \par  \par \par \par Diagnosis :2-3 years ago \par Current Regimen:  Omnipod 5 and Dexcom G6 ( closed loop ) \par Previous regimens: jardiance and januvia ( stopped since DKA 9/2022) , Tresiba 16 units  and Humalog 10 units TIDAC \par Compliance: very good \par SMBG/CGM :  had tiffany now switched to Dexcom : code   [id number]- QDZM \par Hypoglycemia:yes occasional \par Polyuria/polydipsia : no \par Weight change/BMI: gained \par Diet: tring to watch carbs \par Exercise: active \par HBa1c trend: 7.2% ( 11/2022) \par \par \par \par .prevention  \par Statin: lipitor 10 mg daily \par ACE/ARB :lisinopril 5 mg \par Eye examination: due \par Neuropathy: no\par \par   [Dexcom] : Dexcom [FreeTextEntry2] : 57% [FreeTextEntry3] : 35% 7 % very hgih  [FreeTextEntry4] : 1 [de-identified] : 7.5

## 2022-11-28 NOTE — ASSESSMENT
[Carbohydrate Consistent Diet] : carbohydrate consistent diet [Importance of Diet and Exercise] : importance of diet and exercise to improve glycemic control, achieve weight loss and improve cardiovascular health [Exercise/Effect on Glucose] : exercise/effect on glucose [Self Monitoring of Blood Glucose] : self monitoring of blood glucose [FreeTextEntry1] : Mr. IRMA ENNIS  Is  a 42 year  old male with  known HECTOR  who present for follow up \par \par #HECTOR / type 1. DM / weight gain \par - current regimen: omnipod 5 with Dexcom , much improvement in A1c and CGM review \par - advised if hypoglycemia post meal then will change I : C to 1: 12 g from 1: 10 g \par - reviewed carb counting and correction , patient comfortable now and very happy with new pump \par - discuss use if swimming or going to [employer] \par - has back up pens \par - sent baqsimi for hypoglycemia and wife will watch video for administration \par - Lipids good continue lipitor 10 mg daily \par - BP ok and no albuminuria continue lisinopril 5 mg daily \par - opthalmology and podiatry referral \par - weight gain : was obese previously and has insulin resistance too , will start trulicity 0.75 mg Q week and aware of possible hypoglycemia and will need to decrease insulin once started \par \par \par

## 2022-11-28 NOTE — PHYSICAL EXAM
[Alert] : alert [No Acute Distress] : no acute distress [No Proptosis] : no proptosis [No Lid Lag] : no lid lag [No Respiratory Distress] : no respiratory distress [No Accessory Muscle Use] : no accessory muscle use [No Stigmata of Cushings Syndrome] : no stigmata of Cushings Syndrome [Oriented x3] : oriented to person, place, and time [Thyroid Not Enlarged] : the thyroid was not enlarged [No Thyroid Nodules] : no palpable thyroid nodules [Clear to Auscultation] : lungs were clear to auscultation bilaterally [Normal S1, S2] : normal S1 and S2 [No Murmurs] : no murmurs [Regular Rhythm] : with a regular rhythm [No Edema] : no peripheral edema [Not Tender] : non-tender [Soft] : abdomen soft [Abdominal Striae] : abdominal striae present [No Tremors] : no tremors

## 2022-11-28 NOTE — REVIEW OF SYSTEMS
[Fatigue] : fatigue [As Noted in HPI] : as noted in HPI [Recent Weight Gain (___ Lbs)] : recent weight gain: [unfilled] lbs [Recent Weight Loss (___ Lbs)] : no recent weight loss [Visual Field Defect] : no visual field defect [Blurred Vision] : blurred vision [Dysphagia] : no dysphagia [Neck Pain] : no neck pain [Dysphonia] : no dysphonia [Chest Pain] : no chest pain [Palpitations] : no palpitations [Fast Heart Rate] : heart rate is not fast [Lower Ext Edema] : no lower extremity edema [Shortness Of Breath] : no shortness of breath [SOB on Exertion] : no shortness of breath on exertion [Nausea] : no nausea [Constipation] : no constipation [Vomiting] : no vomiting [Diarrhea] : no diarrhea [Headaches] : no headaches [Dizziness] : no dizziness [Pain/Numbness of Digits] : no pain/numbness of digits [Cold Intolerance] : no cold intolerance [Heat Intolerance] : no heat intolerance

## 2022-11-28 NOTE — THERAPY
[Today's Date] : [unfilled] [Humalog] : Humalog [_____] :  [unfilled] mg/dL/U [de-identified] : OMNIPOD 5

## 2022-12-23 ENCOUNTER — NON-APPOINTMENT (OUTPATIENT)
Age: 42
End: 2022-12-23

## 2023-03-14 ENCOUNTER — APPOINTMENT (OUTPATIENT)
Dept: ENDOCRINOLOGY | Facility: CLINIC | Age: 43
End: 2023-03-14
Payer: COMMERCIAL

## 2023-03-14 PROCEDURE — 99212 OFFICE O/P EST SF 10 MIN: CPT | Mod: 95

## 2023-03-14 RX ORDER — DULAGLUTIDE 0.75 MG/.5ML
0.75 INJECTION, SOLUTION SUBCUTANEOUS
Qty: 1 | Refills: 2 | Status: DISCONTINUED | COMMUNITY
Start: 2022-11-28 | End: 2023-03-14

## 2023-03-14 NOTE — ASSESSMENT
[Carbohydrate Consistent Diet] : carbohydrate consistent diet [Importance of Diet and Exercise] : importance of diet and exercise to improve glycemic control, achieve weight loss and improve cardiovascular health [Exercise/Effect on Glucose] : exercise/effect on glucose [Self Monitoring of Blood Glucose] : self monitoring of blood glucose [FreeTextEntry1] : Mr. IRMA ENNIS  Is  a 42 year  old male with  known HECTOR  who present for follow up ( phone visit ) \par \par #HECTOR / type 1. DM / weight gain \par - current regimen: omnipod 5 with Dexcom G6 , trulicity 1.5 mg Q week excellent control  80% in range  and no hypoglycemia \par - continue same \par - has  baqsimi for hypoglycemia \par - Lipids good continue lipitor 10 mg daily \par - BP ok and no albuminuria continue lisinopril 5 mg daily \par - opthalmology and podiatry due \par  \par \par \par

## 2023-03-14 NOTE — THERAPY
[Today's Date] : [unfilled] [Humalog] : Humalog [_____] :  [unfilled] mg/dL/U [de-identified] : OMNIPOD 5

## 2023-03-14 NOTE — REVIEW OF SYSTEMS
[As Noted in HPI] : as noted in HPI [Fatigue] : no fatigue [Recent Weight Gain (___ Lbs)] : no recent weight gain [Recent Weight Loss (___ Lbs)] : no recent weight loss [Visual Field Defect] : no visual field defect [Blurred Vision] : no blurred vision [Dysphagia] : no dysphagia [Neck Pain] : no neck pain [Dysphonia] : no dysphonia [Chest Pain] : no chest pain [Palpitations] : no palpitations [Fast Heart Rate] : heart rate is not fast [Lower Ext Edema] : no lower extremity edema [Shortness Of Breath] : no shortness of breath [SOB on Exertion] : no shortness of breath on exertion [Nausea] : no nausea [Constipation] : no constipation [Vomiting] : no vomiting [Diarrhea] : no diarrhea [Headaches] : no headaches [Dizziness] : no dizziness [Pain/Numbness of Digits] : no pain/numbness of digits [Cold Intolerance] : no cold intolerance [Heat Intolerance] : no heat intolerance

## 2023-03-14 NOTE — HISTORY OF PRESENT ILLNESS
[Dexcom] : Dexcom [FreeTextEntry1] : Mr. IRMA ENNIS  Is  a 42 year  old male with  known HECTOR , who present for follow up ( phone visit ) \par  \par \par \par Diagnosis :2-3 years ago \par Current Regimen:  Omnipod 5 and Dexcom G6 ( closed loop ) , trulicity 1.5 mg Q week \par Previous regimens: jardiance and januvia ( stopped since DKA 9/2022) , Tresiba 16 units  and Humalog 10 units TIDAC \par Compliance: very good \par SMBG/CGM :   Dexcom : code   [id number]- QDZM review 80 % in range no hypoglycemia \par Hypoglycemia:no \par Polyuria/polydipsia : no \par Weight change/BMI: stable \par Diet: tring to watch carbs \par Exercise: active \par HBa1c trend: 7.2% ( 11/2022)...6.8%( 3/2023)  \par \par \par \par .prevention  \par Statin: lipitor 10 mg daily \par ACE/ARB :lisinopril 5 mg \par Eye examination: due \par Neuropathy: no\par \par   [Hypoglycemia] : Patient is not hypoglycemic. [FreeTextEntry2] : 80 [de-identified] : 7%  [FreeTextEntry4] : 0

## 2023-03-14 NOTE — REASON FOR VISIT
[Follow - Up] : a follow-up visit [DM Type 1] : DM Type 1 [Other Location: e.g. School (Enter Location, City,State)___] : at [unfilled], at the time of the visit. [Medical Office: ([facility])___] : at the medical office located in  [Verbal consent obtained from patient] : the patient, [unfilled]

## 2023-03-14 NOTE — PHYSICAL EXAM
[Alert] : alert [No Acute Distress] : no acute distress [No Proptosis] : no proptosis [No Lid Lag] : no lid lag [Thyroid Not Enlarged] : the thyroid was not enlarged [No Thyroid Nodules] : no palpable thyroid nodules [No Respiratory Distress] : no respiratory distress [No Accessory Muscle Use] : no accessory muscle use [Clear to Auscultation] : lungs were clear to auscultation bilaterally [Normal S1, S2] : normal S1 and S2 [No Murmurs] : no murmurs [Regular Rhythm] : with a regular rhythm [No Edema] : no peripheral edema [Not Tender] : non-tender [Soft] : abdomen soft [No Stigmata of Cushings Syndrome] : no stigmata of Cushings Syndrome [Abdominal Striae] : abdominal striae present [No Tremors] : no tremors [Oriented x3] : oriented to person, place, and time

## 2023-04-22 ENCOUNTER — RX RENEWAL (OUTPATIENT)
Age: 43
End: 2023-04-22

## 2023-05-11 NOTE — DISCHARGE NOTE NURSING/CASE MANAGEMENT/SOCIAL WORK - HAVE YOU HAD A FIRST COVID-19 BOOSTER?
[FreeTextEntry1] : \par Previous Therapy\par 1. Pelvic US 8/11/22\par     a) Normal pelvic sonogram.\par 
Yes

## 2023-08-17 ENCOUNTER — APPOINTMENT (OUTPATIENT)
Dept: ENDOCRINOLOGY | Facility: CLINIC | Age: 43
End: 2023-08-17
Payer: COMMERCIAL

## 2023-08-17 VITALS
SYSTOLIC BLOOD PRESSURE: 124 MMHG | DIASTOLIC BLOOD PRESSURE: 84 MMHG | HEIGHT: 65 IN | HEART RATE: 80 BPM | WEIGHT: 165 LBS | OXYGEN SATURATION: 98 % | TEMPERATURE: 97.2 F | BODY MASS INDEX: 27.49 KG/M2

## 2023-08-17 DIAGNOSIS — I10 ESSENTIAL (PRIMARY) HYPERTENSION: ICD-10-CM

## 2023-08-17 DIAGNOSIS — E16.2 HYPOGLYCEMIA, UNSPECIFIED: ICD-10-CM

## 2023-08-17 PROCEDURE — 99214 OFFICE O/P EST MOD 30 MIN: CPT

## 2023-08-17 NOTE — REVIEW OF SYSTEMS
[Fatigue] : no fatigue [Recent Weight Gain (___ Lbs)] : no recent weight gain [Recent Weight Loss (___ Lbs)] : recent weight loss: [unfilled] lbs [As Noted in HPI] : as noted in HPI [Visual Field Defect] : no visual field defect [Blurred Vision] : no blurred vision [Dysphagia] : no dysphagia [Neck Pain] : no neck pain [Dysphonia] : no dysphonia [Chest Pain] : no chest pain [Palpitations] : no palpitations [Fast Heart Rate] : heart rate is not fast [Lower Ext Edema] : no lower extremity edema [Shortness Of Breath] : no shortness of breath [SOB on Exertion] : no shortness of breath on exertion [Nausea] : no nausea [Constipation] : no constipation [Vomiting] : no vomiting [Diarrhea] : no diarrhea [Headaches] : no headaches [Dizziness] : no dizziness [Pain/Numbness of Digits] : no pain/numbness of digits [Cold Intolerance] : no cold intolerance [Heat Intolerance] : no heat intolerance

## 2023-08-17 NOTE — THERAPY
[Today's Date] : [unfilled] [Humalog] : Humalog [_____] :  [unfilled] mg/dL [de-identified] : OMNIPOD 5

## 2023-08-17 NOTE — ASSESSMENT
[Carbohydrate Consistent Diet] : carbohydrate consistent diet [Importance of Diet and Exercise] : importance of diet and exercise to improve glycemic control, achieve weight loss and improve cardiovascular health [Exercise/Effect on Glucose] : exercise/effect on glucose [Self Monitoring of Blood Glucose] : self monitoring of blood glucose [FreeTextEntry1] : Mr. IRMA ENNIS  Is  a 43 year  old male with  known HECTOR  who present for follow up )   #HECTOR / type 1. DM / weight gain  - current regimen: Omnipod 5 with Dexcom G6 , Trulicity 1.5 mg Q week excellent control  89% in range  and no hypoglycemia , only spike if having technical problem with the pod , he will rotate sites  - continue same  - has  baqsimi for hypoglycemia  - Lipids good continue lipitor 10 mg daily  - BP ok and no albuminuria continue lisinopril 5 mg daily  - ophthalmology and podiatry due

## 2023-08-17 NOTE — HISTORY OF PRESENT ILLNESS
[Dexcom] : Dexcom [FreeTextEntry1] : Mr. IRMA ENNIS  Is  a 43 year  old male with  known HECTOR , who present for follow up      Diagnosis :2-3 years ago  Current Regimen:  Omnipod 5 and Dexcom G6 ( closed loop ) , trulicity 1.5 mg Q week  Previous regimens: jardiance and januvia ( stopped since DKA 9/2022) , Tresiba 16 units  and Humalog 10 units TIDAC  Compliance: very good  SMBG/CGM :   Dexcom : code   [id number]-LVZK- QDZM review 89 % in range no hypoglycemia  Hypoglycemia:no  Polyuria/polydipsia : no  Weight change/BMI: stable  Diet: tring to watch carbs  Exercise: active  HBa1c trend: 7.2% ( 11/2022)...6.8%( 3/2023) ..6.6%( 8/2023)      .prevention   Statin: lipitor 10 mg daily  ACE/ARB :lisinopril 5 mg  Eye examination: due  Neuropathy: no    [Hypoglycemia] : Patient is not hypoglycemic. [FreeTextEntry2] : 89% [FreeTextEntry3] : 10 [de-identified] : 6.9% [FreeTextEntry4] : 0

## 2023-08-17 NOTE — DATA REVIEWED
[FreeTextEntry1] : 3/2023: A1c 6.8%   glucose 122  crea 0.79  GFr 114  Tg 58 AST 16 ALT 18 8/2023 A1c 6.6%  LDL 76  TG 80 glucose 141 crea 0.91 TSH 2.38

## 2023-10-21 ENCOUNTER — RX RENEWAL (OUTPATIENT)
Age: 43
End: 2023-10-21

## 2023-11-13 NOTE — HISTORY OF PRESENT ILLNESS
DC orders sent to [facility] for review via [facility]     11/13/23 [id number]   Post-Acute Status   Post-Acute Authorization Placement   Post-Acute Placement Status Pending post-acute provider review/more information requested        [FreeTextEntry1] : 38 y/o male with recently diagnosed DM type 2, HTN and dyslipidemia, presenting for f/u. Feels much better since the last visit. has been complaint with diet and medical therapy. FS are better. Stress test today - no ischemia. Echo - normal.

## 2023-12-12 ENCOUNTER — APPOINTMENT (OUTPATIENT)
Dept: ENDOCRINOLOGY | Facility: CLINIC | Age: 43
End: 2023-12-12
Payer: COMMERCIAL

## 2023-12-12 VITALS
OXYGEN SATURATION: 98 % | BODY MASS INDEX: 24.99 KG/M2 | HEIGHT: 65 IN | HEART RATE: 78 BPM | WEIGHT: 150 LBS | DIASTOLIC BLOOD PRESSURE: 82 MMHG | SYSTOLIC BLOOD PRESSURE: 122 MMHG

## 2023-12-12 PROCEDURE — 99214 OFFICE O/P EST MOD 30 MIN: CPT

## 2023-12-28 ENCOUNTER — RX RENEWAL (OUTPATIENT)
Age: 43
End: 2023-12-28

## 2023-12-28 RX ORDER — ATORVASTATIN CALCIUM 10 MG/1
10 TABLET, FILM COATED ORAL DAILY
Qty: 90 | Refills: 3 | Status: ACTIVE | COMMUNITY
Start: 2022-11-28 | End: 1900-01-01

## 2023-12-28 RX ORDER — LISINOPRIL 5 MG/1
5 TABLET ORAL
Qty: 90 | Refills: 3 | Status: ACTIVE | COMMUNITY
Start: 2022-11-28 | End: 1900-01-01

## 2024-01-02 RX ORDER — INSULIN DEGLUDEC INJECTION 100 U/ML
100 INJECTION, SOLUTION SUBCUTANEOUS DAILY
Qty: 3 | Refills: 1 | Status: ACTIVE | COMMUNITY
Start: 2024-01-02 | End: 1900-01-01

## 2024-01-04 RX ORDER — INSULIN LISPRO 100 [IU]/ML
100 INJECTION, SOLUTION INTRAVENOUS; SUBCUTANEOUS
Qty: 2 | Refills: 1 | Status: ACTIVE | COMMUNITY
Start: 2024-01-04 | End: 1900-01-01

## 2024-03-14 RX ORDER — TIRZEPATIDE 5 MG/.5ML
5 INJECTION, SOLUTION SUBCUTANEOUS
Qty: 3 | Refills: 1 | Status: ACTIVE | COMMUNITY
Start: 2024-03-14 | End: 1900-01-01

## 2024-03-27 ENCOUNTER — RX RENEWAL (OUTPATIENT)
Age: 44
End: 2024-03-27

## 2024-03-27 RX ORDER — INSULIN LISPRO 100 [IU]/ML
100 INJECTION, SOLUTION INTRAVENOUS; SUBCUTANEOUS
Qty: 90 | Refills: 1 | Status: ACTIVE | COMMUNITY
Start: 2022-10-13 | End: 1900-01-01

## 2024-04-23 ENCOUNTER — APPOINTMENT (OUTPATIENT)
Dept: ENDOCRINOLOGY | Facility: CLINIC | Age: 44
End: 2024-04-23
Payer: COMMERCIAL

## 2024-04-23 VITALS
BODY MASS INDEX: 25.99 KG/M2 | HEIGHT: 65 IN | DIASTOLIC BLOOD PRESSURE: 80 MMHG | OXYGEN SATURATION: 98 % | WEIGHT: 156 LBS | SYSTOLIC BLOOD PRESSURE: 120 MMHG | HEART RATE: 72 BPM

## 2024-04-23 DIAGNOSIS — E13.9 OTHER SPECIFIED DIABETES MELLITUS W/OUT COMPLICATIONS: ICD-10-CM

## 2024-04-23 DIAGNOSIS — E78.5 HYPERLIPIDEMIA, UNSPECIFIED: ICD-10-CM

## 2024-04-23 DIAGNOSIS — E11.9 TYPE 2 DIABETES MELLITUS W/OUT COMPLICATIONS: ICD-10-CM

## 2024-04-23 PROCEDURE — 99214 OFFICE O/P EST MOD 30 MIN: CPT

## 2024-04-23 RX ORDER — BLOOD-GLUCOSE SENSOR
EACH MISCELLANEOUS
Qty: 3 | Refills: 1 | Status: ACTIVE | COMMUNITY
Start: 2022-10-05 | End: 1900-01-01

## 2024-04-23 RX ORDER — GLUCAGON 3 MG/1
3 POWDER NASAL
Qty: 1 | Refills: 3 | Status: ACTIVE | COMMUNITY
Start: 2022-11-28 | End: 1900-01-01

## 2024-04-23 RX ORDER — INSULIN PMP CART,AUT,G6/7,CNTR
EACH SUBCUTANEOUS
Qty: 9 | Refills: 1 | Status: ACTIVE | COMMUNITY
Start: 2022-10-05 | End: 1900-01-01

## 2024-04-23 RX ORDER — BLOOD-GLUCOSE TRANSMITTER
EACH MISCELLANEOUS
Qty: 1 | Refills: 3 | Status: ACTIVE | COMMUNITY
Start: 2022-10-05 | End: 1900-01-01

## 2024-04-23 RX ORDER — DULAGLUTIDE 1.5 MG/.5ML
1.5 INJECTION, SOLUTION SUBCUTANEOUS
Qty: 3 | Refills: 1 | Status: DISCONTINUED | COMMUNITY
Start: 2023-01-27 | End: 2024-04-23

## 2024-04-23 NOTE — THERAPY
[Today's Date] : [unfilled] [Humalog] : Humalog [_____] :  [unfilled] mg/dL [de-identified] : OMNIPOD 5

## 2024-04-23 NOTE — DATA REVIEWED
[FreeTextEntry1] : 3/2023: A1c 6.8%   glucose 122  crea 0.79  GFr 114  Tg 58 AST 16 ALT 18 8/2023 A1c 6.6%  LDL 76  TG 80 glucose 141 crea 0.91 TSH 2.38    12/2023: A1c 6.6% glucose 119 crea 0.85    LDL 83  Tg 72  4/18/24:  A1c 6.9%  crea 0.74    LDL 73  Tg 65  glucose 138 b12 > 2000  ACR negative

## 2024-04-23 NOTE — REVIEW OF SYSTEMS
[Recent Weight Loss (___ Lbs)] : recent weight loss: [unfilled] lbs [As Noted in HPI] : as noted in HPI [Nocturia] : nocturia [Fatigue] : no fatigue [Recent Weight Gain (___ Lbs)] : no recent weight gain [Visual Field Defect] : no visual field defect [Blurred Vision] : no blurred vision [Dysphagia] : no dysphagia [Neck Pain] : no neck pain [Dysphonia] : no dysphonia [Chest Pain] : no chest pain [Palpitations] : no palpitations [Fast Heart Rate] : heart rate is not fast [Lower Ext Edema] : no lower extremity edema [Shortness Of Breath] : no shortness of breath [SOB on Exertion] : no shortness of breath on exertion [Nausea] : no nausea [Constipation] : no constipation [Vomiting] : no vomiting [Diarrhea] : no diarrhea [Headaches] : no headaches [Dizziness] : no dizziness [Pain/Numbness of Digits] : no pain/numbness of digits [Cold Intolerance] : no cold intolerance [Heat Intolerance] : no heat intolerance [FreeTextEntry8] : once [de-identified] : has pump site issues

## 2024-04-23 NOTE — HISTORY OF PRESENT ILLNESS
[Dexcom] : Dexcom [FreeTextEntry1] : Mr. IRMA ENNIS  Is  a 44 year  old male with  known HECTOR , who present for follow up      Diagnosis :2-3 years ago  Current Regimen:  Omnipod 5 and Dexcom G6 ( closed loop ) ,Mounjaro 5 mg Q week  Previous regimens: jardiance and januvia ( stopped since DKA 9/2022) , Tresiba 16 units  and Humalog 10 units TIDAC  Compliance: very good  SMBG/CGM :   Dexcom : code   [id number]  review  in range no hypoglycemia  Hypoglycemia:no  Polyuria/polydipsia : no  Weight change/BMI: stable  Diet: tring to watch carbs  Exercise: active  HBa1c trend: 7.2% ( 11/2022)...6.8%( 3/2023) ..6.6%( 8/2023)...6.6%( 12/2023)...6.9%( 4/2024)        .prevention   Statin: lipitor 10 mg daily  ACE/ARB :lisinopril 5 mg  Eye examination: due  Neuropathy: no    [Hypoglycemia] : Patient is not hypoglycemic. [FreeTextEntry2] : 95 [FreeTextEntry3] : 5

## 2024-04-23 NOTE — ASSESSMENT
[Carbohydrate Consistent Diet] : carbohydrate consistent diet [Importance of Diet and Exercise] : importance of diet and exercise to improve glycemic control, achieve weight loss and improve cardiovascular health [Exercise/Effect on Glucose] : exercise/effect on glucose [Self Monitoring of Blood Glucose] : self monitoring of blood glucose [FreeTextEntry1] : Mr. IRMA ENNIS Is a 44 year old male with known HECTOR who present for follow up )  #HECTOR / type 1. DM / weight gain - current regimen: Omnipod 5 with Dexcom G6 , Mounjaro 5 mg Q week ,  excellent control 95% in range and no hypoglycemia , only spike if having technical problem with the pod  or with some weather changes  - continue same - has baqsimi for hypoglycemia - Lipids good continue lipitor 10 mg daily - BP ok and no albuminuria continue lisinopril 5 mg daily - ophthalmology and podiatry due.

## 2024-04-23 NOTE — PHYSICAL EXAM
[Alert] : alert [Healthy Appearance] : healthy appearance [No Acute Distress] : no acute distress [No Proptosis] : no proptosis [No Lid Lag] : no lid lag [Thyroid Not Enlarged] : the thyroid was not enlarged [No Thyroid Nodules] : no palpable thyroid nodules [No Respiratory Distress] : no respiratory distress [No Accessory Muscle Use] : no accessory muscle use [Clear to Auscultation] : lungs were clear to auscultation bilaterally [No Murmurs] : no murmurs [Regular Rhythm] : with a regular rhythm [No Edema] : no peripheral edema [Soft] : abdomen soft [No Stigmata of Cushings Syndrome] : no stigmata of Cushings Syndrome [Oriented x3] : oriented to person, place, and time [de-identified] : lower back omnipod

## 2024-08-22 ENCOUNTER — APPOINTMENT (OUTPATIENT)
Dept: ENDOCRINOLOGY | Facility: CLINIC | Age: 44
End: 2024-08-22
Payer: COMMERCIAL

## 2024-08-22 VITALS
HEIGHT: 65 IN | BODY MASS INDEX: 24.16 KG/M2 | OXYGEN SATURATION: 98 % | HEART RATE: 71 BPM | WEIGHT: 145 LBS | DIASTOLIC BLOOD PRESSURE: 80 MMHG | SYSTOLIC BLOOD PRESSURE: 120 MMHG

## 2024-08-22 DIAGNOSIS — E55.9 VITAMIN D DEFICIENCY, UNSPECIFIED: ICD-10-CM

## 2024-08-22 DIAGNOSIS — E78.5 HYPERLIPIDEMIA, UNSPECIFIED: ICD-10-CM

## 2024-08-22 DIAGNOSIS — E11.9 TYPE 2 DIABETES MELLITUS W/OUT COMPLICATIONS: ICD-10-CM

## 2024-08-22 DIAGNOSIS — E13.9 OTHER SPECIFIED DIABETES MELLITUS W/OUT COMPLICATIONS: ICD-10-CM

## 2024-08-22 PROCEDURE — 99213 OFFICE O/P EST LOW 20 MIN: CPT

## 2024-08-22 NOTE — DATA REVIEWED
[FreeTextEntry1] : 3/2023: A1c 6.8%   glucose 122  crea 0.79  GFr 114  Tg 58 AST 16 ALT 18 8/2023 A1c 6.6%  LDL 76  TG 80 glucose 141 crea 0.91 TSH 2.38    12/2023: A1c 6.6% glucose 119 crea 0.85    LDL 83  Tg 72  4/18/24:  A1c 6.9%  crea 0.74    LDL 73  Tg 65  glucose 138 b12 > 2000  ACR negative  8/2024:  A1c 6.7% glucose 169 crea 0.8  GFr 112   Tg 103  TSH 1.73

## 2024-08-22 NOTE — REVIEW OF SYSTEMS
[Recent Weight Loss (___ Lbs)] : recent weight loss: [unfilled] lbs [As Noted in HPI] : as noted in HPI [Nocturia] : nocturia [Fatigue] : no fatigue [Recent Weight Gain (___ Lbs)] : no recent weight gain [Visual Field Defect] : no visual field defect [Blurred Vision] : no blurred vision [Dysphagia] : no dysphagia [Neck Pain] : no neck pain [Dysphonia] : no dysphonia [Chest Pain] : no chest pain [Palpitations] : no palpitations [Fast Heart Rate] : heart rate is not fast [Lower Ext Edema] : no lower extremity edema [Shortness Of Breath] : no shortness of breath [SOB on Exertion] : no shortness of breath on exertion [Nausea] : no nausea [Constipation] : no constipation [Vomiting] : no vomiting [Diarrhea] : no diarrhea [Headaches] : no headaches [Dizziness] : no dizziness [Pain/Numbness of Digits] : no pain/numbness of digits [Cold Intolerance] : no cold intolerance [Heat Intolerance] : no heat intolerance [FreeTextEntry8] : once [de-identified] : has pump site issues

## 2024-08-22 NOTE — HISTORY OF PRESENT ILLNESS
[Dexcom] : Dexcom [FreeTextEntry1] : Mr. IRMA ENNIS  Is  a 44 year  old male with  known HECTOR , who present for follow up      Diagnosis :2020 Current Regimen:  Omnipod 5 and Dexcom G6 ( closed loop ) ,Mounjaro 5 mg Q week  Previous regimens: jardiance and januvia ( stopped since DKA 9/2022) , Tresiba 16 units  and Humalog 10 units TIDAC  Compliance: very good  SMBG/CGM :   Dexcom : code   [id number]  review  in range no hypoglycemia  Hypoglycemia:no  Polyuria/polydipsia : no  Weight change/BMI: stable  Diet: tring to watch carbs  Exercise: active  HBa1c trend: 7.2% ( 11/2022)...6.8%( 3/2023) ..6.6%( 8/2023)...6.6%( 12/2023)...6.9%( 4/2024)   ...6.7%( 8/2024)      .prevention   Statin: lipitor 10 mg daily  ACE/ARB :lisinopril 5 mg  Eye examination: due  Neuropathy: no    [Hypoglycemia] : Patient is not hypoglycemic. [FreeTextEntry2] : 89 [FreeTextEntry3] : 11 [de-identified] : 6.7

## 2024-08-22 NOTE — HISTORY OF PRESENT ILLNESS
[Dexcom] : Dexcom [FreeTextEntry1] : Mr. IRMA ENNIS  Is  a 44 year  old male with  known HECTOR , who present for follow up      Diagnosis :2020 Current Regimen:  Omnipod 5 and Dexcom G6 ( closed loop ) ,Mounjaro 5 mg Q week  Previous regimens: jardiance and januvia ( stopped since DKA 9/2022) , Tresiba 16 units  and Humalog 10 units TIDAC  Compliance: very good  SMBG/CGM :   Dexcom : code   [id number]  review  in range no hypoglycemia  Hypoglycemia:no  Polyuria/polydipsia : no  Weight change/BMI: stable  Diet: tring to watch carbs  Exercise: active  HBa1c trend: 7.2% ( 11/2022)...6.8%( 3/2023) ..6.6%( 8/2023)...6.6%( 12/2023)...6.9%( 4/2024)   ...6.7%( 8/2024)      .prevention   Statin: lipitor 10 mg daily  ACE/ARB :lisinopril 5 mg  Eye examination: due  Neuropathy: no    [Hypoglycemia] : Patient is not hypoglycemic. [FreeTextEntry2] : 89 [FreeTextEntry3] : 11 [de-identified] : 6.7

## 2024-08-22 NOTE — PHYSICAL EXAM
[Alert] : alert [Healthy Appearance] : healthy appearance [No Acute Distress] : no acute distress [No Proptosis] : no proptosis [No Lid Lag] : no lid lag [Thyroid Not Enlarged] : the thyroid was not enlarged [No Thyroid Nodules] : no palpable thyroid nodules [No Respiratory Distress] : no respiratory distress [No Accessory Muscle Use] : no accessory muscle use [Clear to Auscultation] : lungs were clear to auscultation bilaterally [No Murmurs] : no murmurs [Regular Rhythm] : with a regular rhythm [No Edema] : no peripheral edema [Soft] : abdomen soft [No Stigmata of Cushings Syndrome] : no stigmata of Cushings Syndrome [Oriented x3] : oriented to person, place, and time [de-identified] : lower back omnipod

## 2024-08-22 NOTE — PHYSICAL EXAM
[Alert] : alert [Healthy Appearance] : healthy appearance [No Acute Distress] : no acute distress [No Proptosis] : no proptosis [No Lid Lag] : no lid lag [Thyroid Not Enlarged] : the thyroid was not enlarged [No Thyroid Nodules] : no palpable thyroid nodules [No Respiratory Distress] : no respiratory distress [No Accessory Muscle Use] : no accessory muscle use [Clear to Auscultation] : lungs were clear to auscultation bilaterally [No Murmurs] : no murmurs [Regular Rhythm] : with a regular rhythm [No Edema] : no peripheral edema [Soft] : abdomen soft [No Stigmata of Cushings Syndrome] : no stigmata of Cushings Syndrome [Oriented x3] : oriented to person, place, and time [de-identified] : lower back omnipod

## 2024-08-22 NOTE — ASSESSMENT
[Carbohydrate Consistent Diet] : carbohydrate consistent diet [Importance of Diet and Exercise] : importance of diet and exercise to improve glycemic control, achieve weight loss and improve cardiovascular health [Exercise/Effect on Glucose] : exercise/effect on glucose [Self Monitoring of Blood Glucose] : self monitoring of blood glucose [FreeTextEntry1] : Mr. IRMA ENNIS Is a 44 year old male with known HECTOR who present for follow up )  #HECTOR / type 1. DM / weight gain - current regimen: Omnipod 5 with Dexcom G6 , Mounjaro 5 mg Q week ,  excellent control 95% in range and no hypoglycemia , only spike if having technical problem with the pod   - continue same - has baqsimi for hypoglycemia - Lipids good continue lipitor 10 mg daily - BP ok and no albuminuria continue lisinopril 5 mg daily - ophthalmology and podiatry follow up

## 2024-08-22 NOTE — REVIEW OF SYSTEMS
[Recent Weight Loss (___ Lbs)] : recent weight loss: [unfilled] lbs [As Noted in HPI] : as noted in HPI [Nocturia] : nocturia [Fatigue] : no fatigue [Recent Weight Gain (___ Lbs)] : no recent weight gain [Visual Field Defect] : no visual field defect [Blurred Vision] : no blurred vision [Dysphagia] : no dysphagia [Neck Pain] : no neck pain [Dysphonia] : no dysphonia [Chest Pain] : no chest pain [Palpitations] : no palpitations [Fast Heart Rate] : heart rate is not fast [Lower Ext Edema] : no lower extremity edema [Shortness Of Breath] : no shortness of breath [SOB on Exertion] : no shortness of breath on exertion [Nausea] : no nausea [Constipation] : no constipation [Vomiting] : no vomiting [Diarrhea] : no diarrhea [Headaches] : no headaches [Dizziness] : no dizziness [Pain/Numbness of Digits] : no pain/numbness of digits [Cold Intolerance] : no cold intolerance [Heat Intolerance] : no heat intolerance [FreeTextEntry8] : once [de-identified] : has pump site issues

## 2024-08-22 NOTE — THERAPY
[Today's Date] : [unfilled] [Humalog] : Humalog [_____] :  [unfilled] mg/dL [de-identified] : OMNIPOD 5  Isotretinoin Counseling: Patient should get monthly blood tests, not donate blood, not drive at night if vision affected, not share medication, and not undergo elective surgery for 6 months after tx completed. Side effects reviewed, pt to contact office should one occur.

## 2024-08-22 NOTE — THERAPY
[Today's Date] : [unfilled] [Humalog] : Humalog [_____] :  [unfilled] mg/dL [de-identified] : OMNIPOD 5

## 2024-09-10 ENCOUNTER — RX RENEWAL (OUTPATIENT)
Age: 44
End: 2024-09-10

## 2024-10-21 ENCOUNTER — RX RENEWAL (OUTPATIENT)
Age: 44
End: 2024-10-21

## 2024-12-05 ENCOUNTER — RX RENEWAL (OUTPATIENT)
Age: 44
End: 2024-12-05

## 2025-01-23 ENCOUNTER — APPOINTMENT (OUTPATIENT)
Dept: ENDOCRINOLOGY | Facility: CLINIC | Age: 45
End: 2025-01-23
Payer: COMMERCIAL

## 2025-01-23 DIAGNOSIS — E11.9 TYPE 2 DIABETES MELLITUS W/OUT COMPLICATIONS: ICD-10-CM

## 2025-01-23 DIAGNOSIS — E13.9 OTHER SPECIFIED DIABETES MELLITUS W/OUT COMPLICATIONS: ICD-10-CM

## 2025-01-23 DIAGNOSIS — E78.5 HYPERLIPIDEMIA, UNSPECIFIED: ICD-10-CM

## 2025-01-23 PROCEDURE — 99213 OFFICE O/P EST LOW 20 MIN: CPT

## 2025-01-23 RX ORDER — TIRZEPATIDE 5 MG/.5ML
5 INJECTION, SOLUTION SUBCUTANEOUS
Qty: 3 | Refills: 1 | Status: ACTIVE | COMMUNITY
Start: 2025-01-23 | End: 1900-01-01

## 2025-01-23 RX ORDER — BLOOD-GLUCOSE SENSOR
EACH MISCELLANEOUS
Qty: 9 | Refills: 3 | Status: ACTIVE | COMMUNITY
Start: 2025-01-23 | End: 1900-01-01

## 2025-01-23 RX ORDER — INSULIN DEGLUDEC INJECTION 100 U/ML
100 INJECTION, SOLUTION SUBCUTANEOUS
Qty: 1 | Refills: 5 | Status: ACTIVE | COMMUNITY
Start: 2025-01-23 | End: 1900-01-01

## 2025-05-07 ENCOUNTER — APPOINTMENT (OUTPATIENT)
Dept: ENDOCRINOLOGY | Facility: CLINIC | Age: 45
End: 2025-05-07
Payer: COMMERCIAL

## 2025-05-07 ENCOUNTER — NON-APPOINTMENT (OUTPATIENT)
Age: 45
End: 2025-05-07

## 2025-05-07 VITALS
HEIGHT: 65 IN | BODY MASS INDEX: 23.82 KG/M2 | DIASTOLIC BLOOD PRESSURE: 80 MMHG | OXYGEN SATURATION: 96 % | SYSTOLIC BLOOD PRESSURE: 120 MMHG | WEIGHT: 143 LBS | HEART RATE: 90 BPM

## 2025-05-07 DIAGNOSIS — E11.9 TYPE 2 DIABETES MELLITUS W/OUT COMPLICATIONS: ICD-10-CM

## 2025-05-07 DIAGNOSIS — E16.2 HYPOGLYCEMIA, UNSPECIFIED: ICD-10-CM

## 2025-05-07 DIAGNOSIS — E13.9 OTHER SPECIFIED DIABETES MELLITUS W/OUT COMPLICATIONS: ICD-10-CM

## 2025-05-07 DIAGNOSIS — E78.5 HYPERLIPIDEMIA, UNSPECIFIED: ICD-10-CM

## 2025-05-07 PROCEDURE — 99214 OFFICE O/P EST MOD 30 MIN: CPT

## 2025-08-13 ENCOUNTER — RX RENEWAL (OUTPATIENT)
Age: 45
End: 2025-08-13

## 2025-09-18 ENCOUNTER — APPOINTMENT (OUTPATIENT)
Dept: ENDOCRINOLOGY | Facility: CLINIC | Age: 45
End: 2025-09-18
Payer: COMMERCIAL

## 2025-09-18 VITALS
OXYGEN SATURATION: 98 % | BODY MASS INDEX: 25.16 KG/M2 | WEIGHT: 151 LBS | HEART RATE: 74 BPM | SYSTOLIC BLOOD PRESSURE: 120 MMHG | HEIGHT: 65 IN | DIASTOLIC BLOOD PRESSURE: 78 MMHG | RESPIRATION RATE: 18 BRPM

## 2025-09-18 DIAGNOSIS — Z78.9 OTHER SPECIFIED HEALTH STATUS: ICD-10-CM

## 2025-09-18 DIAGNOSIS — E78.5 HYPERLIPIDEMIA, UNSPECIFIED: ICD-10-CM

## 2025-09-18 DIAGNOSIS — E11.9 TYPE 2 DIABETES MELLITUS W/OUT COMPLICATIONS: ICD-10-CM

## 2025-09-18 DIAGNOSIS — E13.9 OTHER SPECIFIED DIABETES MELLITUS W/OUT COMPLICATIONS: ICD-10-CM

## 2025-09-18 DIAGNOSIS — E16.2 HYPOGLYCEMIA, UNSPECIFIED: ICD-10-CM

## 2025-09-18 DIAGNOSIS — I10 ESSENTIAL (PRIMARY) HYPERTENSION: ICD-10-CM

## 2025-09-18 DIAGNOSIS — E55.9 VITAMIN D DEFICIENCY, UNSPECIFIED: ICD-10-CM

## 2025-09-18 PROCEDURE — 99204 OFFICE O/P NEW MOD 45 MIN: CPT
